# Patient Record
Sex: MALE | Race: WHITE | NOT HISPANIC OR LATINO | Employment: OTHER | URBAN - METROPOLITAN AREA
[De-identification: names, ages, dates, MRNs, and addresses within clinical notes are randomized per-mention and may not be internally consistent; named-entity substitution may affect disease eponyms.]

---

## 2022-06-18 ENCOUNTER — APPOINTMENT (OUTPATIENT)
Dept: CT IMAGING | Facility: CLINIC | Age: 62
End: 2022-06-18
Attending: EMERGENCY MEDICINE
Payer: COMMERCIAL

## 2022-06-18 ENCOUNTER — ANESTHESIA (OUTPATIENT)
Dept: SURGERY | Facility: CLINIC | Age: 62
End: 2022-06-18
Payer: COMMERCIAL

## 2022-06-18 ENCOUNTER — APPOINTMENT (OUTPATIENT)
Dept: GENERAL RADIOLOGY | Facility: CLINIC | Age: 62
End: 2022-06-18
Attending: UROLOGY
Payer: COMMERCIAL

## 2022-06-18 ENCOUNTER — ANESTHESIA EVENT (OUTPATIENT)
Dept: SURGERY | Facility: CLINIC | Age: 62
End: 2022-06-18
Payer: COMMERCIAL

## 2022-06-18 ENCOUNTER — HOSPITAL ENCOUNTER (OUTPATIENT)
Facility: CLINIC | Age: 62
Setting detail: OBSERVATION
Discharge: HOME | End: 2022-06-19
Attending: EMERGENCY MEDICINE | Admitting: INTERNAL MEDICINE
Payer: COMMERCIAL

## 2022-06-18 DIAGNOSIS — N20.1 URETERAL STONE: Primary | ICD-10-CM

## 2022-06-18 DIAGNOSIS — N20.1 URETERAL STONE: ICD-10-CM

## 2022-06-18 DIAGNOSIS — L40.50 PSORIATIC ARTHRITIS (H): Primary | ICD-10-CM

## 2022-06-18 PROBLEM — N20.0 RIGHT KIDNEY STONE: Status: ACTIVE | Noted: 2022-06-18

## 2022-06-18 LAB
ALBUMIN SERPL-MCNC: 3.8 G/DL (ref 3.4–5)
ALBUMIN UR-MCNC: NEGATIVE MG/DL
ALP SERPL-CCNC: 70 U/L (ref 40–150)
ALT SERPL W P-5'-P-CCNC: 24 U/L (ref 0–70)
ANION GAP SERPL CALCULATED.3IONS-SCNC: 5 MMOL/L (ref 3–14)
APPEARANCE UR: CLEAR
AST SERPL W P-5'-P-CCNC: 24 U/L (ref 0–45)
BASOPHILS # BLD AUTO: 0 10E3/UL (ref 0–0.2)
BASOPHILS NFR BLD AUTO: 0 %
BILIRUB SERPL-MCNC: 0.7 MG/DL (ref 0.2–1.3)
BILIRUB UR QL STRIP: NEGATIVE
BUN SERPL-MCNC: 25 MG/DL (ref 7–30)
CALCIUM SERPL-MCNC: 9.4 MG/DL (ref 8.5–10.1)
CHLORIDE BLD-SCNC: 105 MMOL/L (ref 94–109)
CO2 SERPL-SCNC: 26 MMOL/L (ref 20–32)
COLOR UR AUTO: ABNORMAL
CREAT SERPL-MCNC: 1.6 MG/DL (ref 0.66–1.25)
EOSINOPHIL # BLD AUTO: 0 10E3/UL (ref 0–0.7)
EOSINOPHIL NFR BLD AUTO: 0 %
ERYTHROCYTE [DISTWIDTH] IN BLOOD BY AUTOMATED COUNT: 13.5 % (ref 10–15)
GFR SERPL CREATININE-BSD FRML MDRD: 48 ML/MIN/1.73M2
GLUCOSE BLD-MCNC: 123 MG/DL (ref 70–99)
GLUCOSE UR STRIP-MCNC: NEGATIVE MG/DL
HCT VFR BLD AUTO: 49.9 % (ref 40–53)
HGB BLD-MCNC: 16.5 G/DL (ref 13.3–17.7)
HGB UR QL STRIP: ABNORMAL
IMM GRANULOCYTES # BLD: 0.1 10E3/UL
IMM GRANULOCYTES NFR BLD: 0 %
KETONES UR STRIP-MCNC: 20 MG/DL
LEUKOCYTE ESTERASE UR QL STRIP: NEGATIVE
LYMPHOCYTES # BLD AUTO: 0.9 10E3/UL (ref 0.8–5.3)
LYMPHOCYTES NFR BLD AUTO: 8 %
MCH RBC QN AUTO: 32.4 PG (ref 26.5–33)
MCHC RBC AUTO-ENTMCNC: 33.1 G/DL (ref 31.5–36.5)
MCV RBC AUTO: 98 FL (ref 78–100)
MONOCYTES # BLD AUTO: 0.6 10E3/UL (ref 0–1.3)
MONOCYTES NFR BLD AUTO: 5 %
NEUTROPHILS # BLD AUTO: 9.9 10E3/UL (ref 1.6–8.3)
NEUTROPHILS NFR BLD AUTO: 87 %
NITRATE UR QL: NEGATIVE
NRBC # BLD AUTO: 0 10E3/UL
NRBC BLD AUTO-RTO: 0 /100
PH UR STRIP: 6.5 [PH] (ref 5–7)
PLATELET # BLD AUTO: 161 10E3/UL (ref 150–450)
POTASSIUM BLD-SCNC: 4.4 MMOL/L (ref 3.4–5.3)
PROT SERPL-MCNC: 8.5 G/DL (ref 6.8–8.8)
RBC # BLD AUTO: 5.09 10E6/UL (ref 4.4–5.9)
RBC URINE: 133 /HPF
SARS-COV-2 RNA RESP QL NAA+PROBE: NEGATIVE
SODIUM SERPL-SCNC: 136 MMOL/L (ref 133–144)
SP GR UR STRIP: 1 (ref 1–1.03)
UROBILINOGEN UR STRIP-MCNC: NORMAL MG/DL
WBC # BLD AUTO: 11.5 10E3/UL (ref 4–11)
WBC # BLD AUTO: 9.3 10E3/UL (ref 4–11)
WBC URINE: 2 /HPF

## 2022-06-18 PROCEDURE — C2617 STENT, NON-COR, TEM W/O DEL: HCPCS | Performed by: UROLOGY

## 2022-06-18 PROCEDURE — 258N000003 HC RX IP 258 OP 636: Performed by: ANESTHESIOLOGY

## 2022-06-18 PROCEDURE — 250N000013 HC RX MED GY IP 250 OP 250 PS 637: Performed by: NURSE PRACTITIONER

## 2022-06-18 PROCEDURE — 250N000011 HC RX IP 250 OP 636: Performed by: EMERGENCY MEDICINE

## 2022-06-18 PROCEDURE — C1769 GUIDE WIRE: HCPCS | Performed by: UROLOGY

## 2022-06-18 PROCEDURE — C9803 HOPD COVID-19 SPEC COLLECT: HCPCS

## 2022-06-18 PROCEDURE — 258N000001 HC RX 258: Performed by: UROLOGY

## 2022-06-18 PROCEDURE — 272N000001 HC OR GENERAL SUPPLY STERILE: Performed by: UROLOGY

## 2022-06-18 PROCEDURE — 36415 COLL VENOUS BLD VENIPUNCTURE: CPT | Performed by: NURSE PRACTITIONER

## 2022-06-18 PROCEDURE — 74420 UROGRAPHY RTRGR +-KUB: CPT | Mod: 26 | Performed by: UROLOGY

## 2022-06-18 PROCEDURE — 710N000009 HC RECOVERY PHASE 1, LEVEL 1, PER MIN: Performed by: UROLOGY

## 2022-06-18 PROCEDURE — G0378 HOSPITAL OBSERVATION PER HR: HCPCS

## 2022-06-18 PROCEDURE — 258N000003 HC RX IP 258 OP 636: Performed by: NURSE PRACTITIONER

## 2022-06-18 PROCEDURE — 36415 COLL VENOUS BLD VENIPUNCTURE: CPT | Performed by: EMERGENCY MEDICINE

## 2022-06-18 PROCEDURE — 81001 URINALYSIS AUTO W/SCOPE: CPT | Performed by: EMERGENCY MEDICINE

## 2022-06-18 PROCEDURE — 999N000141 HC STATISTIC PRE-PROCEDURE NURSING ASSESSMENT: Performed by: UROLOGY

## 2022-06-18 PROCEDURE — 250N000009 HC RX 250: Performed by: UROLOGY

## 2022-06-18 PROCEDURE — 360N000075 HC SURGERY LEVEL 2, PER MIN: Performed by: UROLOGY

## 2022-06-18 PROCEDURE — 96365 THER/PROPH/DIAG IV INF INIT: CPT | Mod: 59

## 2022-06-18 PROCEDURE — 99285 EMERGENCY DEPT VISIT HI MDM: CPT | Mod: 25

## 2022-06-18 PROCEDURE — 255N000002 HC RX 255 OP 636: Performed by: UROLOGY

## 2022-06-18 PROCEDURE — U0005 INFEC AGEN DETEC AMPLI PROBE: HCPCS | Performed by: EMERGENCY MEDICINE

## 2022-06-18 PROCEDURE — 74177 CT ABD & PELVIS W/CONTRAST: CPT

## 2022-06-18 PROCEDURE — 96361 HYDRATE IV INFUSION ADD-ON: CPT | Mod: XU

## 2022-06-18 PROCEDURE — 85025 COMPLETE CBC W/AUTO DIFF WBC: CPT | Performed by: EMERGENCY MEDICINE

## 2022-06-18 PROCEDURE — 370N000017 HC ANESTHESIA TECHNICAL FEE, PER MIN: Performed by: UROLOGY

## 2022-06-18 PROCEDURE — 258N000003 HC RX IP 258 OP 636: Performed by: EMERGENCY MEDICINE

## 2022-06-18 PROCEDURE — 85048 AUTOMATED LEUKOCYTE COUNT: CPT | Performed by: NURSE PRACTITIONER

## 2022-06-18 PROCEDURE — 99220 PR INITIAL OBSERVATION CARE,LEVEL III: CPT | Performed by: NURSE PRACTITIONER

## 2022-06-18 PROCEDURE — 250N000009 HC RX 250: Performed by: NURSE ANESTHETIST, CERTIFIED REGISTERED

## 2022-06-18 PROCEDURE — 87102 FUNGUS ISOLATION CULTURE: CPT | Performed by: UROLOGY

## 2022-06-18 PROCEDURE — 99203 OFFICE O/P NEW LOW 30 MIN: CPT | Mod: 25 | Performed by: UROLOGY

## 2022-06-18 PROCEDURE — 999N000179 XR SURGERY CARM FLUORO LESS THAN 5 MIN W STILLS

## 2022-06-18 PROCEDURE — 250N000011 HC RX IP 250 OP 636: Performed by: NURSE ANESTHETIST, CERTIFIED REGISTERED

## 2022-06-18 PROCEDURE — 96375 TX/PRO/DX INJ NEW DRUG ADDON: CPT

## 2022-06-18 PROCEDURE — 87086 URINE CULTURE/COLONY COUNT: CPT | Performed by: UROLOGY

## 2022-06-18 PROCEDURE — 80053 COMPREHEN METABOLIC PANEL: CPT | Performed by: EMERGENCY MEDICINE

## 2022-06-18 PROCEDURE — 52332 CYSTOSCOPY AND TREATMENT: CPT | Mod: RT | Performed by: UROLOGY

## 2022-06-18 DEVICE — STENT URETERAL POLARIS ULTRA 6FRX26CM M0061921330
Type: IMPLANTABLE DEVICE | Site: URETER | Status: NON-FUNCTIONAL
Removed: 2022-07-01

## 2022-06-18 RX ORDER — EPHEDRINE SULFATE 50 MG/ML
INJECTION, SOLUTION INTRAVENOUS PRN
Status: DISCONTINUED | OUTPATIENT
Start: 2022-06-18 | End: 2022-06-18

## 2022-06-18 RX ORDER — CYCLOBENZAPRINE HCL 5 MG
10 TABLET ORAL 3 TIMES DAILY PRN
Status: DISCONTINUED | OUTPATIENT
Start: 2022-06-18 | End: 2022-06-19 | Stop reason: HOSPADM

## 2022-06-18 RX ORDER — FENTANYL CITRATE 50 UG/ML
INJECTION, SOLUTION INTRAMUSCULAR; INTRAVENOUS PRN
Status: DISCONTINUED | OUTPATIENT
Start: 2022-06-18 | End: 2022-06-18

## 2022-06-18 RX ORDER — NALOXONE HYDROCHLORIDE 0.4 MG/ML
0.2 INJECTION, SOLUTION INTRAMUSCULAR; INTRAVENOUS; SUBCUTANEOUS
Status: DISCONTINUED | OUTPATIENT
Start: 2022-06-18 | End: 2022-06-19 | Stop reason: HOSPADM

## 2022-06-18 RX ORDER — NALOXONE HYDROCHLORIDE 0.4 MG/ML
0.4 INJECTION, SOLUTION INTRAMUSCULAR; INTRAVENOUS; SUBCUTANEOUS
Status: DISCONTINUED | OUTPATIENT
Start: 2022-06-18 | End: 2022-06-19 | Stop reason: HOSPADM

## 2022-06-18 RX ORDER — ONDANSETRON 2 MG/ML
4 INJECTION INTRAMUSCULAR; INTRAVENOUS EVERY 6 HOURS PRN
Status: DISCONTINUED | OUTPATIENT
Start: 2022-06-18 | End: 2022-06-19 | Stop reason: HOSPADM

## 2022-06-18 RX ORDER — CEFTRIAXONE 1 G/1
1 INJECTION, POWDER, FOR SOLUTION INTRAMUSCULAR; INTRAVENOUS ONCE
Status: COMPLETED | OUTPATIENT
Start: 2022-06-18 | End: 2022-06-18

## 2022-06-18 RX ORDER — HYDROMORPHONE HCL IN WATER/PF 6 MG/30 ML
0.4 PATIENT CONTROLLED ANALGESIA SYRINGE INTRAVENOUS EVERY 5 MIN PRN
Status: DISCONTINUED | OUTPATIENT
Start: 2022-06-18 | End: 2022-06-18 | Stop reason: HOSPADM

## 2022-06-18 RX ORDER — SODIUM CHLORIDE 9 MG/ML
INJECTION, SOLUTION INTRAVENOUS CONTINUOUS
Status: DISCONTINUED | OUTPATIENT
Start: 2022-06-18 | End: 2022-06-19 | Stop reason: HOSPADM

## 2022-06-18 RX ORDER — FENTANYL CITRATE 50 UG/ML
25 INJECTION, SOLUTION INTRAMUSCULAR; INTRAVENOUS
Status: DISCONTINUED | OUTPATIENT
Start: 2022-06-18 | End: 2022-06-18 | Stop reason: HOSPADM

## 2022-06-18 RX ORDER — PREDNISONE 10 MG/1
10 TABLET ORAL SEE ADMIN INSTRUCTIONS
COMMUNITY
Start: 2021-11-11

## 2022-06-18 RX ORDER — LIDOCAINE HYDROCHLORIDE 20 MG/ML
JELLY TOPICAL PRN
Status: DISCONTINUED | OUTPATIENT
Start: 2022-06-18 | End: 2022-06-18 | Stop reason: HOSPADM

## 2022-06-18 RX ORDER — KETOROLAC TROMETHAMINE 15 MG/ML
15 INJECTION, SOLUTION INTRAMUSCULAR; INTRAVENOUS ONCE
Status: COMPLETED | OUTPATIENT
Start: 2022-06-18 | End: 2022-06-18

## 2022-06-18 RX ORDER — CEFAZOLIN SODIUM 2 G/50ML
2 SOLUTION INTRAVENOUS
Status: CANCELLED | OUTPATIENT
Start: 2022-06-18

## 2022-06-18 RX ORDER — DEXAMETHASONE SODIUM PHOSPHATE 4 MG/ML
INJECTION, SOLUTION INTRA-ARTICULAR; INTRALESIONAL; INTRAMUSCULAR; INTRAVENOUS; SOFT TISSUE PRN
Status: DISCONTINUED | OUTPATIENT
Start: 2022-06-18 | End: 2022-06-18

## 2022-06-18 RX ORDER — ONDANSETRON 4 MG/1
4 TABLET, ORALLY DISINTEGRATING ORAL EVERY 30 MIN PRN
Status: DISCONTINUED | OUTPATIENT
Start: 2022-06-18 | End: 2022-06-18 | Stop reason: HOSPADM

## 2022-06-18 RX ORDER — ASPIRIN 325 MG
325 TABLET ORAL EVERY 6 HOURS PRN
Status: ON HOLD | COMMUNITY
End: 2022-06-19

## 2022-06-18 RX ORDER — APREMILAST 30 MG/1
1 TABLET, FILM COATED ORAL 2 TIMES DAILY
COMMUNITY
Start: 2022-04-06

## 2022-06-18 RX ORDER — ACETAMINOPHEN 325 MG/1
650 TABLET ORAL EVERY 6 HOURS PRN
Status: DISCONTINUED | OUTPATIENT
Start: 2022-06-18 | End: 2022-06-19 | Stop reason: HOSPADM

## 2022-06-18 RX ORDER — LABETALOL HYDROCHLORIDE 5 MG/ML
10 INJECTION, SOLUTION INTRAVENOUS EVERY 10 MIN PRN
Status: DISCONTINUED | OUTPATIENT
Start: 2022-06-18 | End: 2022-06-18 | Stop reason: HOSPADM

## 2022-06-18 RX ORDER — KETOROLAC TROMETHAMINE 15 MG/ML
15 INJECTION, SOLUTION INTRAMUSCULAR; INTRAVENOUS EVERY 6 HOURS PRN
Status: DISCONTINUED | OUTPATIENT
Start: 2022-06-18 | End: 2022-06-19 | Stop reason: HOSPADM

## 2022-06-18 RX ORDER — CYCLOBENZAPRINE HCL 10 MG
10 TABLET ORAL 3 TIMES DAILY PRN
COMMUNITY

## 2022-06-18 RX ORDER — CEFAZOLIN SODIUM 2 G/50ML
2 SOLUTION INTRAVENOUS SEE ADMIN INSTRUCTIONS
Status: CANCELLED | OUTPATIENT
Start: 2022-06-18

## 2022-06-18 RX ORDER — TAMSULOSIN HYDROCHLORIDE 0.4 MG/1
0.4 CAPSULE ORAL DAILY
Status: DISCONTINUED | OUTPATIENT
Start: 2022-06-18 | End: 2022-06-19 | Stop reason: HOSPADM

## 2022-06-18 RX ORDER — MEPERIDINE HYDROCHLORIDE 25 MG/ML
25 INJECTION INTRAMUSCULAR; INTRAVENOUS; SUBCUTANEOUS
Status: DISCONTINUED | OUTPATIENT
Start: 2022-06-18 | End: 2022-06-18 | Stop reason: HOSPADM

## 2022-06-18 RX ORDER — SODIUM CHLORIDE, SODIUM LACTATE, POTASSIUM CHLORIDE, CALCIUM CHLORIDE 600; 310; 30; 20 MG/100ML; MG/100ML; MG/100ML; MG/100ML
INJECTION, SOLUTION INTRAVENOUS CONTINUOUS
Status: DISCONTINUED | OUTPATIENT
Start: 2022-06-18 | End: 2022-06-18 | Stop reason: HOSPADM

## 2022-06-18 RX ORDER — FENTANYL CITRATE 50 UG/ML
25 INJECTION, SOLUTION INTRAMUSCULAR; INTRAVENOUS EVERY 5 MIN PRN
Status: DISCONTINUED | OUTPATIENT
Start: 2022-06-18 | End: 2022-06-18 | Stop reason: HOSPADM

## 2022-06-18 RX ORDER — ONDANSETRON 4 MG/1
4 TABLET, ORALLY DISINTEGRATING ORAL EVERY 6 HOURS PRN
Status: DISCONTINUED | OUTPATIENT
Start: 2022-06-18 | End: 2022-06-19 | Stop reason: HOSPADM

## 2022-06-18 RX ORDER — PROPOFOL 10 MG/ML
INJECTION, EMULSION INTRAVENOUS PRN
Status: DISCONTINUED | OUTPATIENT
Start: 2022-06-18 | End: 2022-06-18

## 2022-06-18 RX ORDER — ONDANSETRON 2 MG/ML
INJECTION INTRAMUSCULAR; INTRAVENOUS PRN
Status: DISCONTINUED | OUTPATIENT
Start: 2022-06-18 | End: 2022-06-18

## 2022-06-18 RX ORDER — HYDROMORPHONE HCL IN WATER/PF 6 MG/30 ML
0.2 PATIENT CONTROLLED ANALGESIA SYRINGE INTRAVENOUS ONCE
Status: COMPLETED | OUTPATIENT
Start: 2022-06-18 | End: 2022-06-18

## 2022-06-18 RX ORDER — LIDOCAINE 40 MG/G
CREAM TOPICAL
Status: DISCONTINUED | OUTPATIENT
Start: 2022-06-18 | End: 2022-06-18 | Stop reason: HOSPADM

## 2022-06-18 RX ORDER — KETOROLAC TROMETHAMINE 30 MG/ML
15 INJECTION, SOLUTION INTRAMUSCULAR; INTRAVENOUS
Status: DISCONTINUED | OUTPATIENT
Start: 2022-06-18 | End: 2022-06-18 | Stop reason: HOSPADM

## 2022-06-18 RX ORDER — OXYCODONE HYDROCHLORIDE 5 MG/1
5 TABLET ORAL EVERY 4 HOURS PRN
Status: DISCONTINUED | OUTPATIENT
Start: 2022-06-18 | End: 2022-06-18 | Stop reason: HOSPADM

## 2022-06-18 RX ORDER — FOLIC ACID 1 MG/1
1 TABLET ORAL DAILY
COMMUNITY
Start: 2020-09-28

## 2022-06-18 RX ORDER — PHENYLEPHRINE HYDROCHLORIDE 10 MG/ML
INJECTION INTRAVENOUS PRN
Status: DISCONTINUED | OUTPATIENT
Start: 2022-06-18 | End: 2022-06-18

## 2022-06-18 RX ORDER — ONDANSETRON 2 MG/ML
4 INJECTION INTRAMUSCULAR; INTRAVENOUS EVERY 30 MIN PRN
Status: DISCONTINUED | OUTPATIENT
Start: 2022-06-18 | End: 2022-06-18 | Stop reason: HOSPADM

## 2022-06-18 RX ORDER — LIDOCAINE HYDROCHLORIDE 10 MG/ML
INJECTION, SOLUTION INFILTRATION; PERINEURAL PRN
Status: DISCONTINUED | OUTPATIENT
Start: 2022-06-18 | End: 2022-06-18

## 2022-06-18 RX ORDER — GLYCOPYRROLATE 0.2 MG/ML
INJECTION, SOLUTION INTRAMUSCULAR; INTRAVENOUS PRN
Status: DISCONTINUED | OUTPATIENT
Start: 2022-06-18 | End: 2022-06-18

## 2022-06-18 RX ORDER — IOPAMIDOL 755 MG/ML
90 INJECTION, SOLUTION INTRAVASCULAR ONCE
Status: COMPLETED | OUTPATIENT
Start: 2022-06-18 | End: 2022-06-18

## 2022-06-18 RX ADMIN — ONDANSETRON HYDROCHLORIDE 4 MG: 2 INJECTION, SOLUTION INTRAVENOUS at 15:19

## 2022-06-18 RX ADMIN — KETOROLAC TROMETHAMINE 15 MG: 15 INJECTION, SOLUTION INTRAMUSCULAR; INTRAVENOUS at 09:42

## 2022-06-18 RX ADMIN — GLYCOPYRROLATE 0.2 MG: 0.2 INJECTION, SOLUTION INTRAMUSCULAR; INTRAVENOUS at 15:21

## 2022-06-18 RX ADMIN — DEXAMETHASONE SODIUM PHOSPHATE 8 MG: 4 INJECTION, SOLUTION INTRA-ARTICULAR; INTRALESIONAL; INTRAMUSCULAR; INTRAVENOUS; SOFT TISSUE at 15:09

## 2022-06-18 RX ADMIN — PROPOFOL 160 MG: 10 INJECTION, EMULSION INTRAVENOUS at 15:08

## 2022-06-18 RX ADMIN — CEFTRIAXONE 1 G: 1 INJECTION, POWDER, FOR SOLUTION INTRAMUSCULAR; INTRAVENOUS at 11:55

## 2022-06-18 RX ADMIN — SODIUM CHLORIDE: 9 INJECTION, SOLUTION INTRAVENOUS at 18:58

## 2022-06-18 RX ADMIN — LIDOCAINE HYDROCHLORIDE 50 MG: 10 INJECTION, SOLUTION INFILTRATION; PERINEURAL at 15:08

## 2022-06-18 RX ADMIN — FENTANYL CITRATE 50 MCG: 50 INJECTION, SOLUTION INTRAMUSCULAR; INTRAVENOUS at 15:15

## 2022-06-18 RX ADMIN — SODIUM CHLORIDE, POTASSIUM CHLORIDE, SODIUM LACTATE AND CALCIUM CHLORIDE: 600; 310; 30; 20 INJECTION, SOLUTION INTRAVENOUS at 13:38

## 2022-06-18 RX ADMIN — PHENYLEPHRINE HYDROCHLORIDE 100 MCG: 10 INJECTION INTRAVENOUS at 15:26

## 2022-06-18 RX ADMIN — PHENYLEPHRINE HYDROCHLORIDE 100 MCG: 10 INJECTION INTRAVENOUS at 15:33

## 2022-06-18 RX ADMIN — IOPAMIDOL 90 ML: 755 INJECTION, SOLUTION INTRAVENOUS at 09:21

## 2022-06-18 RX ADMIN — HYDROMORPHONE HYDROCHLORIDE 0.2 MG: 0.2 INJECTION, SOLUTION INTRAMUSCULAR; INTRAVENOUS; SUBCUTANEOUS at 12:28

## 2022-06-18 RX ADMIN — EPHEDRINE SULFATE 5 MG: 50 INJECTION, SOLUTION INTRAVENOUS at 15:23

## 2022-06-18 RX ADMIN — TAMSULOSIN HYDROCHLORIDE 0.4 MG: 0.4 CAPSULE ORAL at 18:10

## 2022-06-18 RX ADMIN — FENTANYL CITRATE 50 MCG: 50 INJECTION, SOLUTION INTRAMUSCULAR; INTRAVENOUS at 15:08

## 2022-06-18 RX ADMIN — MIDAZOLAM 2 MG: 1 INJECTION INTRAMUSCULAR; INTRAVENOUS at 15:05

## 2022-06-18 RX ADMIN — SODIUM CHLORIDE 1000 ML: 9 INJECTION, SOLUTION INTRAVENOUS at 10:23

## 2022-06-18 RX ADMIN — SODIUM CHLORIDE, POTASSIUM CHLORIDE, SODIUM LACTATE AND CALCIUM CHLORIDE: 600; 310; 30; 20 INJECTION, SOLUTION INTRAVENOUS at 15:05

## 2022-06-18 ASSESSMENT — ENCOUNTER SYMPTOMS
DIARRHEA: 1
VOMITING: 0
BLOOD IN STOOL: 0
FEVER: 0
BACK PAIN: 1
ABDOMINAL PAIN: 1

## 2022-06-18 NOTE — PHARMACY-ADMISSION MEDICATION HISTORY
Admission medication history interview status for this patient is complete. See [facility] admission navigator for allergy information, prior to admission medications and immunization status.     Medication history interview done, indicate source(s): Patient  Medication history resources (including written lists, pill bottles, clinic record): [facility] and [facility]  Pharmacy: [id number] [place], [place] - [address] [address]      Changes made to PTA medication list:  Added: Otezla, Folic Acid, Cyclobenzaprine, Prednisone  Changed: Methotrexate 8 tab --> 10 tab  Reported as Not Taking: None  Removed: Levothyroxine    Actions taken by pharmacist (provider contacted, etc): Sticky note to provider     Additional medication history information: Patient has prednisone left over from taper and uses as PRN for psoriasis flairs.     Medication reconciliation/reorder completed by provider prior to medication history?  N   (Y/N)       Prior to Admission medications    Medication Sig Last Dose Taking? Auth Provider Long Term End Date   apremilast (OTEZLA) 30 MG tablet Take 1 tablet by mouth 2 times daily 6/17/2022 at Unknown time Yes Unknown, Entered By History     cyclobenzaprine (FLEXERIL) 10 MG tablet Take 10 mg by mouth 3 times daily as needed for muscle spasms 6/18/2022 at Unknown time Yes Unknown, Entered By History     folic acid (FOLVITE) 1 MG tablet Take 1 tablet by mouth daily 6/17/2022 at Unknown time Yes Unknown, Entered By History     Methotrexate Sodium 2.5 MG TABS Take 10 tablets by mouth once a week On Mondays Past Week at Unknown time Yes Reported, Patient     predniSONE (DELTASONE) 10 MG tablet Take 10 mg by mouth daily Take 15 mg for 3-5 days, then taper by 5 mg every 3-5 days until off.  Yes Unknown, Entered By History     ORDER FOR DME Equipment being ordered: Lift chair   Cabrera Magaña DO

## 2022-06-18 NOTE — CONSULTS
Urology Consultation      Ramesh Galvin MRN# [id number]   YOB: 1960 Age: 61 year old   Date of Admission: 6/18/2022     Reason for consult: Obstructing ureteral stone             Assessment and Plan:   Ramesh Galvin is a 61 year old male with prior history of nephrolithiasis now with 2 obstructing right distal ureteral stones with significant perinephric stranding concerning for upstream UTI. Urine analysis unrevealing for infection, slightly elevated WBC, afebrile     I explained to him to his long nephric stranding seems out of proportion for simply an obstructing stone and I am concerned about an underlying UTI. Therefore, my recommendation would be to stage his stone surgery and place a ureteral stent today and defer the surgery to 1-2 weeks from now. We will send intraoperative urine culture.     We discussed cystoscopy and ureteral stent placement in details. Risks of the procedure including but not limited to infection, bleeding damage to surrounding structure and possibility of nephrostomy tube if unable to place the stent rerogradely. We also discussed stent related pain, discomfort and migration.     To OR for cystoscopy and right ureteral stent placement               Chief Complaint:   Right flank pain          History of Present Illness:   This patient is a 61 year old male who presents with a few days history of right flank pain. This is his second time presenting to ED for flank pain. Repeat CT scan showed 2 obstructing right ureteral stones with moderate right hydronephrosis and significant per nephric stranding. His creatinine is elevated at 1.6 and urine analysis does not show any clear evidence of infection. WBC is slightly elevated at 11.5.     He has passed stones before but has not had any procedure done. He denies any dysuria or hematuria.                Past Medical History:     Past Medical History:   Diagnosis Date     AVM (arteriovenous malformation)       Hypercholesteremia      Hypothyroidism     blood work indicated this, no tumor     Psoriasis      Psoriatic arthritis (H)            Past Surgical History:   History reviewed. No pertinent surgical history.             Social History:     Social History     Tobacco Use     Smoking status: Former Smoker     Packs/day: 1.50     Years: 0.00     Pack years: 0.00     Types: Cigarettes     Quit date: 1981     Years since quittin.4     Smokeless tobacco: Never Used   Substance Use Topics     Alcohol use: Yes     Comment: 1-2 x/month     Drug use: No               Family History:   Non relevant             Allergies:   All allergies reviewed and addressed          Medications:     Current Facility-Administered Medications   Medication     fentaNYL (PF) (SUBLIMAZE) injection 25 mcg     HYDROmorphone (DILAUDID) injection 0.4 mg     ketorolac (TORADOL) injection 15 mg     labetalol (NORMODYNE/TRANDATE) injection 10 mg     lactated ringers infusion     lactated ringers infusion     lidocaine (LMX4) cream     lidocaine 1 % 0.1-1 mL     meperidine (DEMEROL) injection 25 mg     naloxone (NARCAN) injection 0.2 mg    Or     naloxone (NARCAN) injection 0.4 mg    Or     naloxone (NARCAN) injection 0.2 mg    Or     naloxone (NARCAN) injection 0.4 mg     ondansetron (ZOFRAN ODT) ODT tab 4 mg    Or     ondansetron (ZOFRAN) injection 4 mg     oxyCODONE (ROXICODONE) tablet 5 mg     Patient RECEIVING antibiotic to treat a different condition and it provides ADEQUATE COVERAGE for this surgical procedure.     prochlorperazine (COMPAZINE) injection 5 mg     sodium chloride (PF) 0.9% PF flush 3 mL     sodium chloride (PF) 0.9% PF flush 3 mL               Review of Systems:   The 10 point Review of Systems is negative other than noted in the HPI            Physical Exam:   Vitals were reviewed  Temp: 97.6  F (36.4  C) Temp src: Temporal BP: (!) 169/94 Pulse: 63   Resp: 20 SpO2: 96 % O2 Device: None (Room air)    Constitutional:    awake, alert, cooperative, no apparent distress, and appears stated age     Lungs:   No increased work of breathing, good air exchange, clear to auscultation bilaterally, no crackles or wheezing     Cardiovascular:   Normal apical impulse, regular rate and rhythm, normal S1 and S2, no S3 or S4, and no murmur noted     Genitounirinary:   Deferred           Data:     Lab Results   Component Value Date    WBC 11.5 (H) 06/18/2022    HGB 16.5 06/18/2022    HCT 49.9 06/18/2022    MCV 98 06/18/2022     06/18/2022     Lab Results   Component Value Date    CR 1.60 (H) 06/18/2022     No results found for: INR  All cardiac studies reviewed by me.  CT scan of the abdomen:   Obstructing right ureteral stone (3 and 1 mm) with moderate hydronephrosis and significant perinephric stranding

## 2022-06-18 NOTE — ED NOTES
[facility]  ED Nurse Handoff Report    Ramesh Galvin is a 61 year old male   ED Chief complaint: Abdominal Pain and Diarrhea  . ED Diagnosis:   Final diagnoses:   Ureteral stone     Allergies:   Allergies   Allergen Reactions     Codeine Sulfate        Code Status: Full Code  Activity level - Baseline/Home:  Independent. Activity Level - Current:   Independent. Lift room needed: No. Bariatric: No   Needed: No   Isolation: No. Infection: Not Applicable.     Vital Signs:   Vitals:    06/18/22 0847 06/18/22 1030 06/18/22 1100   BP: (!) 162/104 (!) 152/92 (!) 157/99   Pulse: 64 64 64   Resp: 20     Temp: 97.3  F (36.3  C)     TempSrc: Temporal     SpO2: 98% 93% 95%       Cardiac Rhythm:  ,      Pain level:    Patient confused: No. Patient Falls Risk: No.   Elimination Status: Has voided   Patient Report - Initial Complaint: abdominal and back pain. Focused Assessment: Ramesh Galvin is a 61 year old male with history of kidney stones who presents with bilateral lower quadrant abdominal pain and diarrhea. Symptoms began acutely last night and progressed this morning with diarrhea. No fever, vomiting, or blood in stool. He rates his pain 8/10 in severity. Pain is more severe in the right lower quadrant than the left quadrant and radiates to his lower back. He took aspirin and flexeril with some improvement to his back pain. He mentions previous hematuria and kidney stones, but he states this pain feels different. He reports previous hernia and colonoscopy.    Tests Performed: see list. Abnormal Results:   CT Abdomen Pelvis w Contrast   Final Result   IMPRESSION:    1.  Two obstructing calculi adjacent to each other in the right distal ureter measuring 3 mm and 6 mm with moderate right hydroureteronephrosis and perinephric stranding. Adjacent small nonobstructing right renal calculus.   2.  Cholelithiasis.        Labs Ordered and Resulted from Time of ED Arrival to Time of ED Departure    COMPREHENSIVE METABOLIC PANEL - Abnormal       Result Value    Sodium 136      Potassium 4.4      Chloride 105      Carbon Dioxide (CO2) 26      Anion Gap 5      Urea Nitrogen 25      Creatinine 1.60 (*)     Calcium 9.4      Glucose 123 (*)     Alkaline Phosphatase 70      AST 24      ALT 24      Protein Total 8.5      Albumin 3.8      Bilirubin Total 0.7      GFR Estimate 48 (*)    CBC WITH PLATELETS AND DIFFERENTIAL - Abnormal    WBC Count 11.5 (*)     RBC Count 5.09      Hemoglobin 16.5      Hematocrit 49.9      MCV 98      MCH 32.4      MCHC 33.1      RDW 13.5      Platelet Count 161      % Neutrophils 87      % Lymphocytes 8      % Monocytes 5      % Eosinophils 0      % Basophils 0      % Immature Granulocytes 0      NRBCs per 100 WBC 0      Absolute Neutrophils 9.9 (*)     Absolute Lymphocytes 0.9      Absolute Monocytes 0.6      Absolute Eosinophils 0.0      Absolute Basophils 0.0      Absolute Immature Granulocytes 0.1      Absolute NRBCs 0.0     ROUTINE UA WITH MICROSCOPIC REFLEX TO CULTURE - Abnormal    Color Urine Light Yellow      Appearance Urine Clear      Glucose Urine Negative      Bilirubin Urine Negative      Ketones Urine 20  (*)     Specific Gravity Urine 1.005      Blood Urine Large (*)     pH Urine 6.5      Protein Albumin Urine Negative      Urobilinogen Urine Normal      Nitrite Urine Negative      Leukocyte Esterase Urine Negative      RBC Urine 133 (*)     WBC Urine 2     COVID-19 VIRUS (CORONAVIRUS) BY PCR   .   Treatments provided: fluids, IV antibiotics  Family Comments: NA  OBS brochure/video discussed/provided to patient:  Yes  ED Medications:   Medications   cefTRIAXone (ROCEPHIN) 1 g vial to attach to  mL bag for ADULTS or NS 50 mL bag for PEDS (1 g Intravenous New Bag 6/18/22 3625)   ketorolac (TORADOL) injection 15 mg (15 mg Intravenous Given 6/18/22 0946)   iopamidol (ISOVUE-370) solution 90 mL (90 mLs Intravenous Given 6/18/22 0916)   sodium chloride (PF) 0.9% PF flush  65 mL (65 mLs Intravenous Given 6/18/22 0922)   0.9% sodium chloride BOLUS (0 mLs Intravenous Stopped 6/18/22 1123)     Drips infusing:  Yes  For the majority of the shift, the patient's behavior Green. Interventions performed were na.    Sepsis treatment initiated: No     Patient tested for COVID 19 prior to admission: YES    ED Nurse Name/Phone Number: Diana Brown RN,   12:06 PM

## 2022-06-18 NOTE — ED TRIAGE NOTES
Pt arrives with c/o lower abdominal pain and back pain that started yesterday. Pt reports diarrhea the last few times he has used the bathroom, no blood in it. Pt denies nausea/vomiting. ABCs intact.      Triage Assessment     Row Name 06/18/22 0811       Triage Assessment (Adult)    Airway WDL WDL       Respiratory WDL    Respiratory WDL WDL       Skin Circulation/Temperature WDL    Skin Circulation/Temperature WDL WDL       Cardiac WDL    Cardiac WDL WDL       Peripheral/Neurovascular WDL    Peripheral Neurovascular WDL WDL       Cognitive/Neuro/Behavioral WDL    Cognitive/Neuro/Behavioral WDL WDL

## 2022-06-18 NOTE — OP NOTE
DATE OF SERVICE: 06/18/22   PREOPERATIVE DIAGNOSIS: Right ureteral Stone  POSTOPERATIVE DIAGNOSIS: Same    PROCEDURES PERFORMED:   1. Cystourethroscopy  2. Right retrograde pyelography with interpretation of intraoperative fluoroscopic imaging  3. Right ureteral stent placement    STAFF SURGEON: Michael Ho MD  ANESTHESIA: General    ESTIMATED BLOOD LOSS: 1 cc  DRAINS/TUBES:  6 Amharic x 26 cm double-J ureteral stent, 16 Fr Hanson catheter     COMPLICATIONS: None.   SPECIMEN: Renal pelvis fluid for culture  SIGNIFICANT FINDINGS: Mild distal bulbar urethral stricture relatively easily accommodated 22 fr cystoscope, urine upstream from blockage appeared clear     BRIEF OPERATIVE INDICATIONS: Ramesh Galvin is a 61 year old male who recently presented with left ureteral stone. After a discussion of all risks, benefits, and alternatives, the patient elected to proceed with ureteral stent placement. The patient understands the need for more than one procedure to eliminate stone burden.      DESCRIPTION OF PROCEDURE:  After informed consent was verified, the patient was transported to the operating room, placed supine on the table. After adequate anesthesia was induced, he was placed in dorsal lithotomy and prepped and draped in the usual sterile fashion. A timeout was taken to confirm correct patient, procedure and laterality. Pre-operative IV antibiotics were administered.    A 22 Amharic rigid cystoscope was inserted per a well-lubricated urethra. The anterior urethra was unremarkable. There was a mild, short proximal bulbar urethral tricture which was gently dilated by advancing the scope and scope was advanced to the bladder. The ureteral orifices were orthotopic bilaterally. The left ureteral orifice was identified and cannulated with a Sensor wire and 6-Fr open-ended catheter. The wire passed without resistance into the upper pole. A sample of renal pelvis fluid was collected for culture. A retrograde pyelogram  showed Moderate hydronephrosis, but no other filling defects.  A 6 Fr x 26 cm double-J stent was advanced over the Sensor wire, and a good proximal curl was seen in the renal pelvis and the distal curl was seen in the bladder. The bladder was drained. 16 Fr Hanson catheter was placed and balloon was inflated using 10 ml water.   The patient tolerated the procedure well.  No apparent complications. he was transported to the postanesthesia care unit in stable condition.      PLAN  Admit to medicine overnight, patient drove himself to the hospital and does not have anyone to pick him up so needs to stay until tomorrow   Maintain Hanson, can be removed tomorrow morning   Continue antibiotic and switch to PO tomorrow morning prior to discharge (Bactrim or Ciprofloxacin)   Ok to discharge tomorrow unless there is a change in clinical picture   Will arrange for lithotripsy as an outpatient       Michael Ho MD   Department of Urology   [facility]

## 2022-06-18 NOTE — ED PROVIDER NOTES
History   Chief Complaint:  Abdominal Pain and Diarrhea       The history is provided by the patient.      Ramesh Galvin is a 61 year old male with history of kidney stones who presents with bilateral lower quadrant abdominal pain and diarrhea. Symptoms began acutely last night and progressed this morning with diarrhea. No fever, vomiting, or blood in stool. He rates his pain 8/10 in severity. Pain is more severe in the right lower quadrant than the left quadrant and radiates to his lower back. He took aspirin and flexeril with some improvement to his back pain. He mentions previous hematuria and kidney stones, but he states this pain feels different. He reports previous hernia and colonoscopy.     Review of Systems   Constitutional: Negative for fever.   Gastrointestinal: Positive for abdominal pain and diarrhea. Negative for blood in stool and vomiting.   Musculoskeletal: Positive for back pain.   All other systems reviewed and are negative.    Allergies:  Codeine Sulfate  Morphine And Related    Medications:  Levothyroxine sodium   Methotrexate Sodium  Otezla     Past Medical History:     AVM (arteriovenous malformation)   Hypercholesteremia   Hypothyroidism   Psoriasis   Psoriatic arthritis   Congenital anomaly of cerebrovascular system  Multiple sclerosis   Obesity (BMI 30-39.9)  Kidney stones    Past Surgical History:    Incision and drainage    Family History:    Father: lymphatic cancer, hypertension, Diabetes Mellitus Type 2     Social History:  Presents alone.   PCP: No primary care provider on file.      Physical Exam     Patient Vitals for the past 24 hrs:   BP Temp Temp src Pulse Resp SpO2   06/18/22 1100 (!) 157/99 -- -- 64 -- 95 %   06/18/22 1030 (!) 152/92 -- -- 64 -- 93 %   06/18/22 0847 (!) 162/104 97.3  F (36.3  C) Temporal 64 20 98 %     Physical Exam  General:   Well-nourished   Speaking in full sentences  Eyes:   Conjunctiva without injection or scleral icterus  ENT:   Moist mucous  membranes   Nares patent   Pinnae normal  Neck:   Full ROM   No stiffness appreciated  Resp:   Lungs CTAB   No crackles, wheezing or audible rubs   Good air movement  CV:    Normal rate, regular rhythm   S1 and S2 present   No murmur, gallop or rub  GI:   BS present   Abdomen soft without distention   Tenderness to palpation to lower abdomen bilaterally, R>L   No guarding or rebound tenderness  Skin:   Warm, dry, well perfused   No rashes or open wounds on exposed skin  MSK:   Moves all extremities   No focal deformities or swelling  Neuro:   Alert   Answers questions appropriately   Moves all extremities equally   Gait stable  Psych:   Normal affect, normal mood    Emergency Department Course     Imaging:  CT Abdomen Pelvis w Contrast   Final Result   IMPRESSION:    1.  Two obstructing calculi adjacent to each other in the right distal ureter measuring 3 mm and 6 mm with moderate right hydroureteronephrosis and perinephric stranding. Adjacent small nonobstructing right renal calculus.   2.  Cholelithiasis.        Report per radiology    Laboratory:  Labs Ordered and Resulted from Time of ED Arrival to Time of ED Departure   COMPREHENSIVE METABOLIC PANEL - Abnormal       Result Value    Sodium 136      Potassium 4.4      Chloride 105      Carbon Dioxide (CO2) 26      Anion Gap 5      Urea Nitrogen 25      Creatinine 1.60 (*)     Calcium 9.4      Glucose 123 (*)     Alkaline Phosphatase 70      AST 24      ALT 24      Protein Total 8.5      Albumin 3.8      Bilirubin Total 0.7      GFR Estimate 48 (*)    CBC WITH PLATELETS AND DIFFERENTIAL - Abnormal    WBC Count 11.5 (*)     RBC Count 5.09      Hemoglobin 16.5      Hematocrit 49.9      MCV 98      MCH 32.4      MCHC 33.1      RDW 13.5      Platelet Count 161      % Neutrophils 87      % Lymphocytes 8      % Monocytes 5      % Eosinophils 0      % Basophils 0      % Immature Granulocytes 0      NRBCs per 100 WBC 0      Absolute Neutrophils 9.9 (*)     Absolute  Lymphocytes 0.9      Absolute Monocytes 0.6      Absolute Eosinophils 0.0      Absolute Basophils 0.0      Absolute Immature Granulocytes 0.1      Absolute NRBCs 0.0     ROUTINE UA WITH MICROSCOPIC REFLEX TO CULTURE - Abnormal    Color Urine Light Yellow      Appearance Urine Clear      Glucose Urine Negative      Bilirubin Urine Negative      Ketones Urine 20  (*)     Specific Gravity Urine 1.005      Blood Urine Large (*)     pH Urine 6.5      Protein Albumin Urine Negative      Urobilinogen Urine Normal      Nitrite Urine Negative      Leukocyte Esterase Urine Negative      RBC Urine 133 (*)     WBC Urine 2     COVID-19 VIRUS (CORONAVIRUS) BY PCR - Normal    SARS CoV2 PCR Negative        Emergency Department Course:    Reviewed:  I reviewed nursing notes, vitals, past medical history and Care Everywhere    Assessments:  0900 I obtained history and examined the patient as noted above.   1155 I rechecked the patient and explained findings.     Consults:  1130  I spoke with Dr. Ho of the Urology service from [facility] regarding patient's presentation, findings, and plan of care.    1153  I spoke with Dr. Cruz of the Hospitalist service from [facility] regarding patient's presentation, findings, and plan of care.    Interventions:  0942 Toradol 15mg IV   1023 NS, 1 L, IV   1155 Ceftriaxone 1g IV   1228 Dilaudid 0.2mg IV         Disposition:  The patient was admitted to the hospital under the care of Dr. Cruz.     Impression & Plan     Medical Decision Making:  Ramesh Galvin is a 61 yr old M presenting to the ED for evaluation of abdominal pain and diarrhea.  VS on presentation reveal elevated BP though otherwise are unremarkable.  History, exam, and ED course as outlined above.  CT imaging performed confirming 2 obstructing distal ureteral calculi measuring 3 mm and 6 mm.  On review of previous records, patient had previously been identified to have a 5 mm mid ureteral stone with hydronephrosis.   His renal function is mildly increased compared with previous, though GFR remains at 48.  His urinalysis demonstrates microscopic hematuria, though negative leukocyte esterase, negative nitrates, and 2 WBCs.  Patient formed of incidentally noted cholelithiasis as well as mild prostatomegaly.  Here in the ER, he was provided the above supportive cares noting improvement in symptoms.  I discussed the case with urology, who recommended ceftriaxone, n.p.o., and admission for observation and likely intervention this afternoon.  Patient updated and in agreement with outlined plan of care.  Questions have been answered prior to admission.    Diagnosis:    ICD-10-CM    1. Ureteral stone  N20.1 Case Request: CYSTOSCOPY, WITH URETERAL STENT INSERTION     Case Request: CYSTOSCOPY, WITH URETERAL STENT INSERTION     Scribe Disclosure:  NILAM, Stas Cisneros, am serving as a scribe at 8:51 AM on 6/18/2022 to document services personally performed by Elan Bell MD based on my observations and the provider's statements to me.          Elan Bell MD  06/18/22 2116

## 2022-06-18 NOTE — ANESTHESIA PREPROCEDURE EVALUATION
Anesthesia Pre-Procedure Evaluation    Patient: Ramesh Galvin   MRN: [id number] : 1960        Procedure : Procedure(s):  CYSTOSCOPY, WITH URETERAL STENT INSERTION          Past Medical History:   Diagnosis Date     AVM (arteriovenous malformation)      Hypercholesteremia      Hypothyroidism     blood work indicated this, no tumor     Psoriasis      Psoriatic arthritis (H)       History reviewed. No pertinent surgical history.   Allergies   Allergen Reactions     Codeine Sulfate       Social History     Tobacco Use     Smoking status: Former Smoker     Packs/day: 1.50     Years: 0.00     Pack years: 0.00     Types: Cigarettes     Quit date: 1981     Years since quittin.4     Smokeless tobacco: Never Used   Substance Use Topics     Alcohol use: Yes     Comment: 1-2 x/month      Wt Readings from Last 1 Encounters:   22 99.8 kg (220 lb)        Anesthesia Evaluation   Pt has had prior anesthetic. Type: General.    No history of anesthetic complications       ROS/MED HX  ENT/Pulmonary:  - neg pulmonary ROS     Neurologic:     (+) Multiple Sclerosis,     Cardiovascular:  - neg cardiovascular ROS     METS/Exercise Tolerance:     Hematologic:  - neg hematologic  ROS     Musculoskeletal:  - neg musculoskeletal ROS     GI/Hepatic:     (+) cholecystitis/cholelithiasis,     Renal/Genitourinary:     (+) Nephrolithiasis ,     Endo:     (+) thyroid problem, hypothyroidism, Obesity,     Psychiatric/Substance Use:  - neg psychiatric ROS     Infectious Disease:  - neg infectious disease ROS     Malignancy:  - neg malignancy ROS     Other:  - neg other ROS          Physical Exam    Airway        Mallampati: II   TM distance: > 3 FB   Neck ROM: full   Mouth opening: > 3 cm    Respiratory Devices and Support         Dental  no notable dental history         Cardiovascular   cardiovascular exam normal          Pulmonary   pulmonary exam normal                OUTSIDE LABS:  CBC:   Lab Results   Component Value  Date    WBC 11.5 (H) 06/18/2022    WBC 7.7 04/03/2013    HGB 16.5 06/18/2022    HGB 16.4 04/03/2013    HCT 49.9 06/18/2022    HCT 50.3 04/03/2013     06/18/2022     04/03/2013     BMP:   Lab Results   Component Value Date     06/18/2022    POTASSIUM 4.4 06/18/2022    CHLORIDE 105 06/18/2022    CO2 26 06/18/2022    BUN 25 06/18/2022    CR 1.60 (H) 06/18/2022     (H) 06/18/2022     COAGS: No results found for: PTT, INR, FIBR  POC: No results found for: BGM, HCG, HCGS  HEPATIC:   Lab Results   Component Value Date    ALBUMIN 3.8 06/18/2022    PROTTOTAL 8.5 06/18/2022    ALT 24 06/18/2022    AST 24 06/18/2022    ALKPHOS 70 06/18/2022    BILITOTAL 0.7 06/18/2022     OTHER:   Lab Results   Component Value Date    LANCE 9.4 06/18/2022       Anesthesia Plan    ASA Status:  3   NPO Status:  NPO Appropriate    Anesthesia Type: General.     - Airway: LMA   Induction: Inhalational.   Maintenance: Balanced.        Consents            Postoperative Care    Pain management: IV analgesics, Oral pain medications, Multi-modal analgesia.   PONV prophylaxis: Ondansetron (or other 5HT-3), Dexamethasone or Solumedrol     Comments:                Magnus Conteh MD

## 2022-06-18 NOTE — ANESTHESIA CARE TRANSFER NOTE
Patient: Ramesh Galvin    Procedure: Procedure(s):  CYSTOSCOPY, WITH URETERAL STENT INSERTION       Diagnosis: Ureteral stone [N20.1]  Diagnosis Additional Information: No value filed.    Anesthesia Type:   General     Note:    Oropharynx: oropharynx clear of all foreign objects  Level of Consciousness: awake  Oxygen Supplementation: face mask    Independent Airway: airway patency satisfactory and stable  Dentition: dentition unchanged  Vital Signs Stable: post-procedure vital signs reviewed and stable  Report to RN Given: handoff report given  Patient transferred to: PACU    Handoff Report: Identifed the Patient, Identified the Reponsible Provider, Reviewed the pertinent medical history, Discussed the surgical course, Reviewed Intra-OP anesthesia mangement and issues during anesthesia, Set expectations for post-procedure period and Allowed opportunity for questions and acknowledgement of understanding      Vitals:  Vitals Value Taken Time   /90 06/18/22 1550   Temp     Pulse 80 06/18/22 1551   Resp 11 06/18/22 1551   SpO2 100 % 06/18/22 1551   Vitals shown include unvalidated device data.    Electronically Signed By: LYNN Paz CRNA  June 18, 2022  3:52 PM

## 2022-06-18 NOTE — PLAN OF CARE
Goal Outcome Evaluation:    Plan of Care Reviewed With: patient     Overall Patient Progress: improving  [id number]  Pt up from PACU at 1800, able to ambulate from cart to bed. Hanson in place and draining light pink urine. IV fluids at 100/hr. Afebrile, VSS, capnography in place. Flomax given. Will strain urine and continue post op cares post stent placement.  Pt aware of plan of care, resting comfortably and watching TV, will have cl liqs and a few bites of solids this evening.

## 2022-06-18 NOTE — ANESTHESIA POSTPROCEDURE EVALUATION
Patient: Ramesh Galvin    Procedure: Procedure(s):  CYSTOSCOPY, WITH URETERAL STENT INSERTION       Anesthesia Type:  General    Note:     Postop Pain Control: Uneventful            Sign Out: Well controlled pain   PONV: No   Neuro/Psych: Uneventful            Sign Out: Acceptable/Baseline neuro status   Airway/Respiratory: Uneventful            Sign Out: Acceptable/Baseline resp. status   CV/Hemodynamics: Uneventful            Sign Out: Acceptable CV status   Other NRE: NONE   DID A NON-ROUTINE EVENT OCCUR? No           Last vitals:  Vitals Value Taken Time   /82 06/18/22 1610   Temp 96.4  F (35.8  C) 06/18/22 1550   Pulse 79 06/18/22 1613   Resp 19 06/18/22 1613   SpO2 100 % 06/18/22 1613   Vitals shown include unvalidated device data.    Electronically Signed By: Magnus Conteh MD  June 18, 2022  4:14 PM

## 2022-06-18 NOTE — H&P
[facility]  Outpatient/Observation Unit  Admission History and Physical Examination    Date of service: 2022   Patient name: Ramesh Galvin   : 1960   MRN: [id number]     Informant: Electronic Health Record, ED Provider, and patient as they are capable    Chief Complaint   Patient presents with     Abdominal Pain     Diarrhea       Assessment/Plan:    Right Ureteral kidney stone with Right Hydronephrosis    Assessment: Patient presented with lower abdominal pain 8/10 that started last night PTA. History of recent Hematuria with urology appt on 22- opted for non-surgical management at that time. In ED, VSS. Labs with Cr 1.60, , WBC 11.5. UA with large blood and 20 ketones. CT abdomen with 2 obstructing calculi adjacent to each other in the right distal ureter measuring 3 mm and 6 mm with moderate right hydroureteronephrosis and perinephric stranding.  Adjacent small non obstructing right renal calculus    Plan:     Urology consultation    NPO now for lithotripsy later today    Aggressive IVF replacement with straining urine     Supportive care with pain control with Toradol and antiemesis with Zofran    Start Flomax     Am labs: WBC and BMP    Acute Kidney Injury   Assessment: Cr 1.60 with baseline Cr around 1.1-1.2.    Plan:     IVF hydration and recheck in AM    Multiple sclerosis (H)    Assessment: noted. Not currently having flare up.       Psoriatic arthritis    Assessment: Noted.  Takes Methotrexate at home.     Plan:     Hold methotrexate       Obesity (BMI 30-39.9)   Known condition at baseline. Patient has increased morbidity and mortality from associated conditions as consequence of obesity such as hypertension, diabetes mellitus (type II), hyperlipidemia, coronary artery disease, degenerative joint disease, sleep apnea, stetohepatitis, cancer, and psychosocial disorders.    Encourage healthy lifestyle and eating    Warrants out patient evaluation and counseling  if not already performed      [unfilled]Advanced Care Planning  Code Status: Full code  Diet:   NPO for anticipated intervention later in the evening  DVT Prophylaxis: None- ambulatory  Hanson Catheter: Not present  Central Lines: None  Cardiac Monitoring: None    Observation    Teena Cramer DNP, NP-C  Hospitalist Service  [facility]  Securely message with the Vocera Web Console (learn more here)  Text page via [employer] Paging/Directory     HPI:    Ramesh Galvin is a 61 year old male with a history of psoriatic arthritis, hypercholesterolemia, multiple sclerosis who presented to the ED with right lower abdominal pain.  Hx was obtained by speaking with the Emergency Room physician, chart review and interview with the patient.     Patient presents with acute lower abdominal pain 8 out of 10 that started last night.  He reports that he was unable to get any sleep due to pain.  Denies any hematuria.    Of note, he was recently seen by urologist about a month ago on 5/11/2022 for hematuria.  He had CT urogram which showed 2 nonobstructive stones in the kidneys and obstructing stone in the right proximal ureter.  He opted for nonsurgical intervention at that time.    Patient denies nausea, vomiting or diarrhea. Denies cough, chest pain, or shortness of breath and no reported headaches or vision changes. Denies any urinary symptoms of dysuria or frequency and is afebrile without chills. Patient reports no recent long distance travel, camping or sick changes.      ED course: In ED, VSS. Labs with Cr 1.60, , WBC 11.5. UA with large blood and 20 ketones. CT abdomen with 2 obstructing calculi adjacent to each other in the right distal ureter measuring 3 mm and 6 mm with moderate right hydroureteronephrosis and perinephric stranding.  Adjacent small non obstructing right renal calculus.  Given ceftriaxone, Dilaudid, Toradol.  ED provider discussed case with urologist who plan to perform intervention  later in the day.    SUMMARY OF PRESENTING SYMPTOMS  Location: Lower abdomen  Severity: Moderate to severe  Timin day PTA  Modifying factors: None  Quality: Sharp  Duration: Intermittent  Context: Kidney stones  Associated signs and symptoms: Pain    REVIEW OF SYSTEMS:   10 point ROS negative other than the symptoms noted above.    History:       Allergies   Allergen Reactions     Codeine Sulfate        Past Medical History:   Diagnosis Date     AVM (arteriovenous malformation)      Hypercholesteremia      Hypothyroidism     blood work indicated this, no tumor     Psoriasis      Psoriatic arthritis (H)        No past surgical history on file.    Family History   Problem Relation Age of Onset     Cancer Father         lymphatic     Cancer Maternal Grandfather         lung        Social History     Socioeconomic History     Marital status:      Spouse name: Ruben     Number of children: 0     Years of education: Not on file     Highest education level: Not on file   Occupational History     Occupation:    Tobacco Use     Smoking status: Former Smoker     Packs/day: 1.50     Years: 0.00     Pack years: 0.00     Types: Cigarettes     Quit date: 1981     Years since quittin.4     Smokeless tobacco: Not on file   Substance and Sexual Activity     Alcohol use: Yes     Comment: 1-2 x/month     Drug use: No     Sexual activity: Not on file   Other Topics Concern     Not on file   Social History Narrative     Not on file     Social Determinants of Health     Financial Resource Strain: Not on file   Food Insecurity: Not on file   Transportation Needs: Not on file   Physical Activity: Not on file   Stress: Not on file   Social Connections: Not on file   Intimate Partner Violence: Not on file   Housing Stability: Not on file       No current facility-administered medications on file prior to encounter.  LEVOTHYROXINE SODIUM PO, Take 1 tablet by mouth daily.  Methotrexate Sodium 2.5 MG TABS, Take  8 tablets by mouth once a week.  ORDER FOR DME, Equipment being ordered: Lift chair      Exam:    Vitals:  BP (!) 157/99   Pulse 64   Temp 97.3  F (36.3  C) (Temporal)   Resp 20   SpO2 95%     No intake or output data in the 24 hours ending 06/18/22 1241    General: NAD, well nourished, well developed, good hygiene, in no acute distress, alert and oriented X3  HEENT: Atraumatic, normocephalic, No scleral icterus or ptosis. No nasal discharges. Normal hearing. External ears are normal. Moist mucus membranes. No oral lesions.   Neck: No JVD, thyromegaly, lymphadenopathy, or bruits.   Cardio: RRR, positive S1, S2, no M/R/G.   Lungs: CTAB, no W/R/R, equal expansion, no labored breathing, no respiratory distress,  no increased WOB, stridor, pursed lip or accessory muscle use, talking in full sentences  Abdomen: NT, + BS x4.  Mild tenderness with palpation to lower abdomen  Extremities: Warm and well perfused.  No edema, cyanosis, clubbing  Skin: Visualized skin is within normal limits, no rashes, ulcerations or petechiae  Pulses: Dorsalis pedis, posterior tibial: 2+ easily palpable.  Good capillary refill  Psychiatry: Normal affect, normal speech, judgment and insight are normal  Neuro: No focal deficits.  Motor strength is appropriate for age     Data:  Data reviewed today: I reviewed all medications, new labs and imaging results over the last 24 hours.    Results for orders placed or performed during the hospital encounter of 06/18/22   CT Abdomen Pelvis w Contrast     Status: None    Narrative    EXAM: CT ABDOMEN PELVIS W CONTRAST  LOCATION: [facility]  DATE/TIME: 6/18/2022 9:20 AM    INDICATION: RLQ abdominal pain, appendicitis suspected (Age >= 14y)  COMPARISON: None.  TECHNIQUE: CT scan of the abdomen and pelvis was performed following injection of IV contrast. Multiplanar reformats were obtained. Dose reduction techniques were used.  CONTRAST: 90mL Isovue 370    FINDINGS:   LOWER CHEST:  The visualized lung bases are clear. Small hiatal hernia.    HEPATOBILIARY: No focal lesions in the liver. Cholelithiasis.    PANCREAS: Normal.    SPLEEN: Normal.    ADRENAL GLANDS: Normal.    KIDNEYS/BLADDER: Two obstructing calculi adjacent to each other in the right distal ureter measuring 3 mm and 6 mm (sagittal series 5, image 66 and axial series 3, image [id number]). Associated moderate right hydronephrosis and hydroureter and perinephric   stranding. Additional nonobstructing right renal calculus measuring 4 mm. No right renal masses. Left renal simple cyst requiring no specific follow-up. No left renal calculi, masses or hydronephrosis. Partially distended urinary bladder without focal   abnormality.    BOWEL: Normal with no obstruction or acute inflammatory change. Nothing for appendicitis.    LYMPH NODES: No lymphadenopathy.    VASCULATURE: No abdominal aortic aneurysm.    PELVIC ORGANS: Mild prostatomegaly with the median lobe indenting the urinary bladder base. No free fluid or fluid collections.    MUSCULOSKELETAL: Unremarkable.      Impression    IMPRESSION:   1.  Two obstructing calculi adjacent to each other in the right distal ureter measuring 3 mm and 6 mm with moderate right hydroureteronephrosis and perinephric stranding. Adjacent small nonobstructing right renal calculus.  2.  Cholelithiasis.   Comprehensive metabolic panel     Status: Abnormal   Result Value Ref Range    Sodium 136 133 - 144 mmol/L    Potassium 4.4 3.4 - 5.3 mmol/L    Chloride 105 94 - 109 mmol/L    Carbon Dioxide (CO2) 26 20 - 32 mmol/L    Anion Gap 5 3 - 14 mmol/L    Urea Nitrogen 25 7 - 30 mg/dL    Creatinine 1.60 (H) 0.66 - 1.25 mg/dL    Calcium 9.4 8.5 - 10.1 mg/dL    Glucose 123 (H) 70 - 99 mg/dL    Alkaline Phosphatase 70 40 - 150 U/L    AST 24 0 - 45 U/L    ALT 24 0 - 70 U/L    Protein Total 8.5 6.8 - 8.8 g/dL    Albumin 3.8 3.4 - 5.0 g/dL    Bilirubin Total 0.7 0.2 - 1.3 mg/dL    GFR Estimate 48 (L) >60 mL/min/1.73m2    CBC with platelets and differential     Status: Abnormal   Result Value Ref Range    WBC Count 11.5 (H) 4.0 - 11.0 10e3/uL    RBC Count 5.09 4.40 - 5.90 10e6/uL    Hemoglobin 16.5 13.3 - 17.7 g/dL    Hematocrit 49.9 40.0 - 53.0 %    MCV 98 78 - 100 fL    MCH 32.4 26.5 - 33.0 pg    MCHC 33.1 31.5 - 36.5 g/dL    RDW 13.5 10.0 - 15.0 %    Platelet Count 161 150 - 450 10e3/uL    % Neutrophils 87 %    % Lymphocytes 8 %    % Monocytes 5 %    % Eosinophils 0 %    % Basophils 0 %    % Immature Granulocytes 0 %    NRBCs per 100 WBC 0 <1 /100    Absolute Neutrophils 9.9 (H) 1.6 - 8.3 10e3/uL    Absolute Lymphocytes 0.9 0.8 - 5.3 10e3/uL    Absolute Monocytes 0.6 0.0 - 1.3 10e3/uL    Absolute Eosinophils 0.0 0.0 - 0.7 10e3/uL    Absolute Basophils 0.0 0.0 - 0.2 10e3/uL    Absolute Immature Granulocytes 0.1 <=0.4 10e3/uL    Absolute NRBCs 0.0 10e3/uL   UA with Microscopic reflex to Culture     Status: Abnormal    Specimen: Urine, Midstream   Result Value Ref Range    Color Urine Light Yellow Colorless, Straw, Light Yellow, Yellow    Appearance Urine Clear Clear    Glucose Urine Negative Negative mg/dL    Bilirubin Urine Negative Negative    Ketones Urine 20  (A) Negative mg/dL    Specific Gravity Urine 1.005 1.003 - 1.035    Blood Urine Large (A) Negative    pH Urine 6.5 5.0 - 7.0    Protein Albumin Urine Negative Negative mg/dL    Urobilinogen Urine Normal Normal, 2.0 mg/dL    Nitrite Urine Negative Negative    Leukocyte Esterase Urine Negative Negative    RBC Urine 133 (H) <=2 /HPF    WBC Urine 2 <=5 /HPF    Narrative    Urine Culture not indicated   CBC with platelets differential     Status: Abnormal    Narrative    The following orders were created for panel order CBC with platelets differential.  Procedure                               Abnormality         Status                     ---------                               -----------         ------                     CBC with platelets and d...[[id number]]  Abnormal             Final result                 Please view results for these tests on the individual orders.      Labs:  Recent Labs   Lab 06/18/22  0900      POTASSIUM 4.4   CHLORIDE 105   CO2 26   ANIONGAP 5   *   BUN 25   CR 1.60*   GFRESTIMATED 48*   LANCE 9.4     Recent Labs   Lab 06/18/22  0900   WBC 11.5*   HGB 16.5   HCT 49.9   MCV 98        Recent Labs   Lab 06/18/22  1008   COLOR Light Yellow   APPEARANCE Clear   URINEGLC Negative   URINEBILI Negative   URINEKETONE 20 *   SG 1.005   UBLD Large*   URINEPH 6.5   PROTEIN Negative   NITRITE Negative   LEUKEST Negative   RBCU 133*   WBCU 2        Imaging:   Recent Results (from the past 24 hour(s))   CT Abdomen Pelvis w Contrast    Narrative    EXAM: CT ABDOMEN PELVIS W CONTRAST  LOCATION: [facility]  DATE/TIME: 6/18/2022 9:20 AM    INDICATION: RLQ abdominal pain, appendicitis suspected (Age >= 14y)  COMPARISON: None.  TECHNIQUE: CT scan of the abdomen and pelvis was performed following injection of IV contrast. Multiplanar reformats were obtained. Dose reduction techniques were used.  CONTRAST: 90mL Isovue 370    FINDINGS:   LOWER CHEST: The visualized lung bases are clear. Small hiatal hernia.    HEPATOBILIARY: No focal lesions in the liver. Cholelithiasis.    PANCREAS: Normal.    SPLEEN: Normal.    ADRENAL GLANDS: Normal.    KIDNEYS/BLADDER: Two obstructing calculi adjacent to each other in the right distal ureter measuring 3 mm and 6 mm (sagittal series 5, image 66 and axial series 3, image [id number]). Associated moderate right hydronephrosis and hydroureter and perinephric   stranding. Additional nonobstructing right renal calculus measuring 4 mm. No right renal masses. Left renal simple cyst requiring no specific follow-up. No left renal calculi, masses or hydronephrosis. Partially distended urinary bladder without focal   abnormality.    BOWEL: Normal with no obstruction or acute inflammatory change. Nothing for  appendicitis.    LYMPH NODES: No lymphadenopathy.    VASCULATURE: No abdominal aortic aneurysm.    PELVIC ORGANS: Mild prostatomegaly with the median lobe indenting the urinary bladder base. No free fluid or fluid collections.    MUSCULOSKELETAL: Unremarkable.      Impression    IMPRESSION:   1.  Two obstructing calculi adjacent to each other in the right distal ureter measuring 3 mm and 6 mm with moderate right hydroureteronephrosis and perinephric stranding. Adjacent small nonobstructing right renal calculus.  2.  Cholelithiasis.

## 2022-06-19 VITALS
RESPIRATION RATE: 16 BRPM | SYSTOLIC BLOOD PRESSURE: 140 MMHG | HEART RATE: 53 BPM | OXYGEN SATURATION: 97 % | TEMPERATURE: 97.6 F | BODY MASS INDEX: 33.34 KG/M2 | DIASTOLIC BLOOD PRESSURE: 89 MMHG | HEIGHT: 68 IN | WEIGHT: 220 LBS

## 2022-06-19 LAB
ANION GAP SERPL CALCULATED.3IONS-SCNC: 5 MMOL/L (ref 3–14)
BUN SERPL-MCNC: 20 MG/DL (ref 7–30)
CALCIUM SERPL-MCNC: 8.8 MG/DL (ref 8.5–10.1)
CHLORIDE BLD-SCNC: 106 MMOL/L (ref 94–109)
CO2 SERPL-SCNC: 25 MMOL/L (ref 20–32)
CREAT SERPL-MCNC: 1.24 MG/DL (ref 0.66–1.25)
GFR SERPL CREATININE-BSD FRML MDRD: 66 ML/MIN/1.73M2
GLUCOSE BLD-MCNC: 152 MG/DL (ref 70–99)
POTASSIUM BLD-SCNC: 4.5 MMOL/L (ref 3.4–5.3)
SODIUM SERPL-SCNC: 136 MMOL/L (ref 133–144)

## 2022-06-19 PROCEDURE — 258N000003 HC RX IP 258 OP 636: Performed by: NURSE PRACTITIONER

## 2022-06-19 PROCEDURE — 82310 ASSAY OF CALCIUM: CPT | Performed by: NURSE PRACTITIONER

## 2022-06-19 PROCEDURE — 36415 COLL VENOUS BLD VENIPUNCTURE: CPT | Performed by: NURSE PRACTITIONER

## 2022-06-19 PROCEDURE — 99217 PR OBSERVATION CARE DISCHARGE: CPT | Performed by: INTERNAL MEDICINE

## 2022-06-19 PROCEDURE — 250N000013 HC RX MED GY IP 250 OP 250 PS 637: Performed by: NURSE PRACTITIONER

## 2022-06-19 PROCEDURE — G0378 HOSPITAL OBSERVATION PER HR: HCPCS

## 2022-06-19 RX ORDER — SULFAMETHOXAZOLE/TRIMETHOPRIM 800-160 MG
1 TABLET ORAL 2 TIMES DAILY
Qty: 12 TABLET | Refills: 0 | Status: SHIPPED | OUTPATIENT
Start: 2022-06-19 | End: 2022-06-19

## 2022-06-19 RX ORDER — SULFAMETHOXAZOLE/TRIMETHOPRIM 800-160 MG
1 TABLET ORAL 2 TIMES DAILY
Qty: 7 TABLET | Refills: 0 | Status: SHIPPED | OUTPATIENT
Start: 2022-06-19 | End: 2022-06-19

## 2022-06-19 RX ORDER — SULFAMETHOXAZOLE/TRIMETHOPRIM 800-160 MG
1 TABLET ORAL 2 TIMES DAILY
Qty: 12 TABLET | Refills: 0 | Status: ON HOLD | OUTPATIENT
Start: 2022-06-19 | End: 2022-07-02

## 2022-06-19 RX ORDER — TAMSULOSIN HYDROCHLORIDE 0.4 MG/1
0.4 CAPSULE ORAL DAILY
Qty: 7 CAPSULE | Refills: 0 | Status: ON HOLD | OUTPATIENT
Start: 2022-06-20 | End: 2022-07-01

## 2022-06-19 RX ORDER — METHOTREXATE 2.5 MG/1
10 TABLET ORAL WEEKLY
Qty: 1 TABLET | Refills: 0
Start: 2022-06-19

## 2022-06-19 RX ORDER — TAMSULOSIN HYDROCHLORIDE 0.4 MG/1
0.4 CAPSULE ORAL DAILY
Qty: 7 CAPSULE | Refills: 0 | Status: SHIPPED | OUTPATIENT
Start: 2022-06-20 | End: 2022-06-19

## 2022-06-19 RX ADMIN — SODIUM CHLORIDE: 9 INJECTION, SOLUTION INTRAVENOUS at 03:42

## 2022-06-19 RX ADMIN — TAMSULOSIN HYDROCHLORIDE 0.4 MG: 0.4 CAPSULE ORAL at 08:17

## 2022-06-19 NOTE — PROVIDER NOTIFICATION
1940 - Hospitalist service paged requesting order for aceves (per urology note to keep in overnight) and if ABX are being continued overnight./ awaiting response.     Per Hospitalist, aceves order placed. ABX order will be deferred to AM team. Will continue to monitor.

## 2022-06-19 NOTE — DISCHARGE SUMMARY
[facility]  Hospitalist Discharge Summary      Date of Admission:  6/18/2022  Date of Discharge:  6/19/2022  Discharging Provider: Brandon Brown MD  Discharge Service: Hospitalist Service    Discharge Diagnoses         Follow-ups Needed After Discharge   Follow-up Appointments     Follow-up and recommended labs and tests       Follow up with primary care provider, Gee Michaelsabrina Javier, within   7 days for hospital follow- up.  No follow up labs or test are needed.  Follow up with urology as scheduled.             Unresulted Labs Ordered in the Past 30 Days of this Admission     Date and Time Order Name Status Description    6/18/2022  3:49 PM Fungal or Yeast Culture Routine In process     6/18/2022  3:49 PM Urine Culture In process       These results will be followed up by PCP.    Discharge Disposition   Discharged to home  Condition at discharge: Stable      Hospital Course      Ramesh Galvin is a 61 year old male with a history of psoriatic arthritis, hypercholesterolemia, multiple sclerosis who presented to the ED with right lower abdominal pain.  Hx was obtained by speaking with the Emergency Room physician, chart review and interview with the patient.      Patient presents with acute lower abdominal pain 8 out of 10 that started last night.  He reports that he was unable to get any sleep due to pain.  Denies any hematuria.     Of note, he was recently seen by urologist about a month ago on 5/11/2022 for hematuria.  He had CT urogram which showed 2 nonobstructive stones in the kidneys and obstructing stone in the right proximal ureter.  He opted for nonsurgical intervention at that time.     Patient denies nausea, vomiting or diarrhea. Denies cough, chest pain, or shortness of breath and no reported headaches or vision changes. Denies any urinary symptoms of dysuria or frequency and is afebrile without chills. Patient reports no recent long distance travel, camping or sick  changes.       ED course: In ED, VSS. Labs with Cr 1.60, , WBC 11.5. UA with large blood and 20 ketones. CT abdomen with 2 obstructing calculi adjacent to each other in the right distal ureter measuring 3 mm and 6 mm with moderate right hydroureteronephrosis and perinephric stranding.  Adjacent small non obstructing right renal calculus.  Given ceftriaxone, Dilaudid, Toradol.  ED provider discussed case with urologist who plan to perform intervention later in the day.    S/p cysto with R ureteral stent placement. Afebrile. Hanson out. Will plan on discharge with PO bactrim and hold methotrexate whilst on bactrim.       Consultations This Hospital Stay   CARE MANAGEMENT / SOCIAL WORK IP CONSULT  UROLOGY IP CONSULT    Code Status   Full Code    Time Spent on this Encounter   I, Brandon Brown MD, personally saw the patient today and spent less than or equal to 30 minutes discharging this patient.       Brandon Brown MD  [facility]  [address] [place] [address]  Phone: [phone]  Fax: [phone]  ______________________________________________________________________    Physical Exam   Vital Signs: Temp: 97.6  F (36.4  C) Temp src: Oral BP: (!) 140/89 Pulse: 53   Resp: 16 SpO2: 97 %   Oxygen Delivery: 4 LPM  Weight: 220 lbs 0 oz    Gen - AAO x 3 in NAD.  Lungs - CTA B.  Heart - RR,S1+S2 nml, no m/g/r.  Abd - soft, NT, ND, + BS.       Primary Care Physician   [facility]    Discharge Orders      Follow-up and recommended labs and tests     Follow up with primary care provider, [facility], within 7 days for hospital follow- up.  No follow up labs or test are needed.  Follow up with urology as scheduled.     Activity    Your activity upon discharge: activity as tolerated     Diet    Follow this diet upon discharge: Orders Placed This Encounter      Advance Diet as Tolerated: Regular Diet Adult       Significant Results and Procedures    Results for orders placed or performed during the hospital encounter of 06/18/22   CT Abdomen Pelvis w Contrast    Narrative    EXAM: CT ABDOMEN PELVIS W CONTRAST  LOCATION: [facility]  DATE/TIME: 6/18/2022 9:20 AM    INDICATION: RLQ abdominal pain, appendicitis suspected (Age >= 14y)  COMPARISON: None.  TECHNIQUE: CT scan of the abdomen and pelvis was performed following injection of IV contrast. Multiplanar reformats were obtained. Dose reduction techniques were used.  CONTRAST: 90mL Isovue 370    FINDINGS:   LOWER CHEST: The visualized lung bases are clear. Small hiatal hernia.    HEPATOBILIARY: No focal lesions in the liver. Cholelithiasis.    PANCREAS: Normal.    SPLEEN: Normal.    ADRENAL GLANDS: Normal.    KIDNEYS/BLADDER: Two obstructing calculi adjacent to each other in the right distal ureter measuring 3 mm and 6 mm (sagittal series 5, image 66 and axial series 3, image [id number]). Associated moderate right hydronephrosis and hydroureter and perinephric   stranding. Additional nonobstructing right renal calculus measuring 4 mm. No right renal masses. Left renal simple cyst requiring no specific follow-up. No left renal calculi, masses or hydronephrosis. Partially distended urinary bladder without focal   abnormality.    BOWEL: Normal with no obstruction or acute inflammatory change. Nothing for appendicitis.    LYMPH NODES: No lymphadenopathy.    VASCULATURE: No abdominal aortic aneurysm.    PELVIC ORGANS: Mild prostatomegaly with the median lobe indenting the urinary bladder base. No free fluid or fluid collections.    MUSCULOSKELETAL: Unremarkable.      Impression    IMPRESSION:   1.  Two obstructing calculi adjacent to each other in the right distal ureter measuring 3 mm and 6 mm with moderate right hydroureteronephrosis and perinephric stranding. Adjacent small nonobstructing right renal calculus.  2.  Cholelithiasis.   XR Surgery LEAH L/T 5 Min Fluoro w Stills    Narrative     This exam was marked as non-reportable because it will not be read by a   radiologist or a [place] non-radiologist provider.               Discharge Medications   Current Discharge Medication List      START taking these medications    Details   sulfamethoxazole-trimethoprim (BACTRIM DS) 800-160 MG tablet Take 1 tablet by mouth 2 times daily for 6 days  Qty: 12 tablet, Refills: 0    Associated Diagnoses: Ureteral stone      tamsulosin (FLOMAX) 0.4 MG capsule Take 1 capsule (0.4 mg) by mouth daily for 7 days  Qty: 7 capsule, Refills: 0    Associated Diagnoses: Ureteral stone         CONTINUE these medications which have CHANGED    Details   methotrexate sodium 2.5 MG TABS Take 10 tablets (25 mg) by mouth once a week On Mondays  Qty: 1 tablet, Refills: 0    Comments: Skip methotrexate 6/20 and resume 6/27.  Associated Diagnoses: Psoriatic arthritis (H)         CONTINUE these medications which have NOT CHANGED    Details   cyclobenzaprine (FLEXERIL) 10 MG tablet Take 10 mg by mouth 3 times daily as needed for muscle spasms      folic acid (FOLVITE) 1 MG tablet Take 1 tablet by mouth daily      predniSONE (DELTASONE) 10 MG tablet Take 10 mg by mouth daily Take 15 mg for 3-5 days, then taper by 5 mg every 3-5 days until off.      apremilast (OTEZLA) 30 MG tablet Take 1 tablet by mouth 2 times daily      ORDER FOR DME Equipment being ordered: Lift chair  Qty: 1 each, Refills: 0    Associated Diagnoses: Multiple sclerosis (H)         STOP taking these medications       aspirin (ASA) 325 MG tablet Comments:   Reason for Stopping:             Allergies   Allergies   Allergen Reactions     Codeine Sulfate

## 2022-06-19 NOTE — PLAN OF CARE
Goal Outcome Evaluation:        Vital Signs: WNL.   Pain/Comfort: patient stating that he is in no pain at this time. Patient encouraged to call RN if patient starts having increased pain. Patient stated an understanding.   Assessment: PIV site c/d/i with IVF infusing per MAR.   Diet: patient tolerating PO intake. Fluids encouraged as tolerated.   Output: aceves in placed an draining light pink urine. No clots noted. Urine strained - no clots noted.   Activity/Ambulation: patient up to bathroom with SBA.   Social: patient calm and cooperative   Plan: Pain control. Maintain PIV. IVF. Monitor I&O. Maintain aceves. Possible discharge pending voiding after aceves removal. Will continue to monitor and will notify service with any questions or concerns.             0400 - patient stating aceves is uncomfortable and asking when aceves can be removed. MD paged. OK to removed aceves per MD.      0500 - aceves removed per MD orders and patient request. Patient tolerated removal well. Patient informed that he will need to void within 4 hours of aceves being removed (about 9 am). Patient stated an understanding. Patient with a few small drips of blood after aceves removed. Will continue to monitor.

## 2022-06-19 NOTE — PROGRESS NOTES
Cross cover notified patient requesting Hanson removal due to discomfort.  Reviewed urology op note.  Plans to remove Hanson this morning if otherwise doing well.  Slight pink tinge to the urine following his procedure, but no dark red blood or clots.  -Remove Hanson catheter and bladder scan as needed if not able to void

## 2022-06-19 NOTE — PLAN OF CARE
Vss afebrile. C/o pain 2/10, denying the needs for pain meds. Voiding dark haider urine. Iv removed. Tolerating regular diet.   Discharge instructions reviewed with pt. Pt understood all discharge instructions, meds, f/U Appts needed and s/s of when to call the dr. Clean urinal and strainer given to pt for home as well as spec cup if he would pass the stone. 2 meds sent to [facility] for pt to  later.     Plan of Care Reviewed With: patient

## 2022-06-19 NOTE — PHARMACY-ADMISSION MEDICATION HISTORY
Admission medication history interview status for this patient is complete. See [facility] admission navigator for allergy information, prior to admission medications and immunization status.     Medication history interview done, indicate source(s): Patient  Medication history resources (including written lists, pill bottles, clinic record):[facility] med list  Pharmacy: [facility]    Changes made to [facility] medication list:  Added: ASA  Changed: Methotrexate to 25 mg  Reported as Not Taking: none  Removed: levothyroxine    Actions taken by pharmacist (provider contacted, etc):None     Additional medication history information:None    Medication reconciliation/reorder completed by provider prior to medication history?  No   (Y/N)         Prior to Admission medications    Medication Sig Last Dose Taking? Auth Provider Long Term End Date   aspirin (ASA) 325 MG tablet Take 325 mg by mouth every 6 hours as needed for moderate pain 6/17/2022 at Unknown time Yes Unknown, Entered By History     cyclobenzaprine (FLEXERIL) 10 MG tablet Take 10 mg by mouth 3 times daily as needed for muscle spasms 6/18/2022 at Unknown time Yes Unknown, Entered By History     folic acid (FOLVITE) 1 MG tablet Take 1 tablet by mouth daily 6/16/2022 at am Yes Unknown, Entered By History     Methotrexate Sodium 2.5 MG TABS Take 10 tablets by mouth once a week On Mondays 6/13/2022 at am Yes Reported, Patient     predniSONE (DELTASONE) 10 MG tablet Take 10 mg by mouth daily Take 15 mg for 3-5 days, then taper by 5 mg every 3-5 days until off. More than a month at Unknown time Yes Unknown, Entered By History     apremilast (OTEZLA) 30 MG tablet Take 1 tablet by mouth 2 times daily  at pm  Unknown, Entered By History     ORDER FOR DME Equipment being ordered: Lift chair   Cabrera Magaña DO

## 2022-06-20 LAB — BACTERIA UR CULT: NO GROWTH

## 2022-06-22 LAB — BACTERIA UR CULT: NO GROWTH

## 2022-06-23 DIAGNOSIS — N20.1 URETERAL STONE: Primary | ICD-10-CM

## 2022-06-24 ENCOUNTER — HOSPITAL ENCOUNTER (OUTPATIENT)
Facility: CLINIC | Age: 62
End: 2022-06-24
Attending: UROLOGY | Admitting: UROLOGY
Payer: COMMERCIAL

## 2022-06-28 ENCOUNTER — TELEPHONE (OUTPATIENT)
Dept: UROLOGY | Facility: CLINIC | Age: 62
End: 2022-06-28

## 2022-06-28 ENCOUNTER — LAB (OUTPATIENT)
Dept: LAB | Facility: CLINIC | Age: 62
End: 2022-06-28
Payer: COMMERCIAL

## 2022-06-28 DIAGNOSIS — Z01.818 PREOPERATIVE EXAMINATION: ICD-10-CM

## 2022-06-28 DIAGNOSIS — Z01.818 PREOPERATIVE EXAMINATION: Primary | ICD-10-CM

## 2022-06-28 LAB — SARS-COV-2 RNA RESP QL NAA+PROBE: NEGATIVE

## 2022-06-28 PROCEDURE — U0003 INFECTIOUS AGENT DETECTION BY NUCLEIC ACID (DNA OR RNA); SEVERE ACUTE RESPIRATORY SYNDROME CORONAVIRUS 2 (SARS-COV-2) (CORONAVIRUS DISEASE [COVID-19]), AMPLIFIED PROBE TECHNIQUE, MAKING USE OF HIGH THROUGHPUT TECHNOLOGIES AS DESCRIBED BY CMS-2020-01-R: HCPCS

## 2022-06-28 PROCEDURE — U0005 INFEC AGEN DETEC AMPLI PROBE: HCPCS

## 2022-06-28 NOTE — TELEPHONE ENCOUNTER
Patient is scheduled for procedure with Dr. Ho     Spoke with: Patient via patient      Date of Surgery: Friday July 01, 2022     Location: [facility] OR      Informed patient they will need an adult : Yes     Pre-op: Yes      H&P: Patient completed      Pre-procedure COVID-19 Test: Tuesday June 28, 2022 at [facility]     Post-op: TBD    Additional imaging/appointments:     Additional comments:      Surgery packet: N/A    Patient is aware that surgery time is tentative to change and to expect a call 3-1 business days from Pre Admission Nursing for instructions and arrival time

## 2022-06-28 NOTE — TELEPHONE ENCOUNTER
----- Message from Jina Amador sent at 6/28/2022  9:18 AM CDT -----  Could you please place a stat Covid order for this patient, he's having surgery with [place] on 7/1 I am going to have him do his test today.

## 2022-06-28 NOTE — TELEPHONE ENCOUNTER
----- Message from Jina Amador sent at 6/27/2022  9:08 AM CDT -----  Odalys could he do 7/1 at the ? I have a time on hold but I am super swamped I wont have time to call this patient, if you could help with that, I'd appreciate it greatly.     ----- Message -----  From: Michael Ho MD  Sent: 6/25/2022   8:34 PM CDT  To: Odalys Anthony    Might be able to. Jina-What do you think?   ----- Message -----  From: Odalys Santiago  Sent: 6/24/2022  10:05 AM CDT  To: Michael Ho MD    The soonest I was able to schedule his URS at [facility] was 7/14, due to Dr. Fuentes being in the cysto room on 6/30. Do you think you could get him in sooner at the ?    Odalys

## 2022-06-29 ENCOUNTER — PATIENT OUTREACH (OUTPATIENT)
Dept: UROLOGY | Facility: CLINIC | Age: 62
End: 2022-06-29

## 2022-07-01 ENCOUNTER — HOSPITAL ENCOUNTER (OUTPATIENT)
Facility: CLINIC | Age: 62
Discharge: HOME | End: 2022-07-01
Attending: UROLOGY | Admitting: UROLOGY
Payer: COMMERCIAL

## 2022-07-01 ENCOUNTER — ANESTHESIA (OUTPATIENT)
Dept: SURGERY | Facility: CLINIC | Age: 62
End: 2022-07-01
Payer: COMMERCIAL

## 2022-07-01 ENCOUNTER — ANESTHESIA EVENT (OUTPATIENT)
Dept: SURGERY | Facility: CLINIC | Age: 62
End: 2022-07-01
Payer: COMMERCIAL

## 2022-07-01 ENCOUNTER — APPOINTMENT (OUTPATIENT)
Dept: GENERAL RADIOLOGY | Facility: CLINIC | Age: 62
End: 2022-07-01
Attending: UROLOGY
Payer: COMMERCIAL

## 2022-07-01 VITALS
WEIGHT: 203.26 LBS | SYSTOLIC BLOOD PRESSURE: 150 MMHG | HEIGHT: 69 IN | HEART RATE: 65 BPM | DIASTOLIC BLOOD PRESSURE: 95 MMHG | TEMPERATURE: 97.5 F | OXYGEN SATURATION: 99 % | RESPIRATION RATE: 16 BRPM | BODY MASS INDEX: 30.11 KG/M2

## 2022-07-01 DIAGNOSIS — N32.89 BLADDER SPASMS: Primary | ICD-10-CM

## 2022-07-01 DIAGNOSIS — N20.0 RIGHT KIDNEY STONE: Primary | ICD-10-CM

## 2022-07-01 LAB — GLUCOSE BLDC GLUCOMTR-MCNC: 84 MG/DL (ref 70–99)

## 2022-07-01 PROCEDURE — 710N000010 HC RECOVERY PHASE 1, LEVEL 2, PER MIN: Performed by: UROLOGY

## 2022-07-01 PROCEDURE — 999N000141 HC STATISTIC PRE-PROCEDURE NURSING ASSESSMENT: Performed by: UROLOGY

## 2022-07-01 PROCEDURE — C2617 STENT, NON-COR, TEM W/O DEL: HCPCS | Performed by: UROLOGY

## 2022-07-01 PROCEDURE — C1769 GUIDE WIRE: HCPCS | Performed by: UROLOGY

## 2022-07-01 PROCEDURE — 255N000002 HC RX 255 OP 636: Performed by: UROLOGY

## 2022-07-01 PROCEDURE — 710N000012 HC RECOVERY PHASE 2, PER MINUTE: Performed by: UROLOGY

## 2022-07-01 PROCEDURE — 999N000179 XR SURGERY CARM FLUORO LESS THAN 5 MIN W STILLS: Mod: TC

## 2022-07-01 PROCEDURE — 250N000011 HC RX IP 250 OP 636

## 2022-07-01 PROCEDURE — 250N000009 HC RX 250: Performed by: UROLOGY

## 2022-07-01 PROCEDURE — 82365 CALCULUS SPECTROSCOPY: CPT | Performed by: UROLOGY

## 2022-07-01 PROCEDURE — 74420 UROGRAPHY RTRGR +-KUB: CPT | Mod: 26 | Performed by: UROLOGY

## 2022-07-01 PROCEDURE — 82962 GLUCOSE BLOOD TEST: CPT

## 2022-07-01 PROCEDURE — C1758 CATHETER, URETERAL: HCPCS | Performed by: UROLOGY

## 2022-07-01 PROCEDURE — 250N000025 HC SEVOFLURANE, PER MIN: Performed by: UROLOGY

## 2022-07-01 PROCEDURE — 360N000083 HC SURGERY LEVEL 3 W/ FLUORO, PER MIN: Performed by: UROLOGY

## 2022-07-01 PROCEDURE — 258N000003 HC RX IP 258 OP 636

## 2022-07-01 PROCEDURE — 258N000003 HC RX IP 258 OP 636: Performed by: ANESTHESIOLOGY

## 2022-07-01 PROCEDURE — 272N000001 HC OR GENERAL SUPPLY STERILE: Performed by: UROLOGY

## 2022-07-01 PROCEDURE — 52356 CYSTO/URETERO W/LITHOTRIPSY: CPT | Mod: RT | Performed by: UROLOGY

## 2022-07-01 PROCEDURE — 250N000009 HC RX 250

## 2022-07-01 PROCEDURE — 250N000013 HC RX MED GY IP 250 OP 250 PS 637: Performed by: ANESTHESIOLOGY

## 2022-07-01 PROCEDURE — 272N000002 HC OR SUPPLY OTHER OPNP: Performed by: UROLOGY

## 2022-07-01 PROCEDURE — 370N000017 HC ANESTHESIA TECHNICAL FEE, PER MIN: Performed by: UROLOGY

## 2022-07-01 DEVICE — URETERAL STENT
Type: IMPLANTABLE DEVICE | Site: URETER | Status: FUNCTIONAL
Brand: PERCUFLEX™ PLUS

## 2022-07-01 RX ORDER — FENTANYL CITRATE 50 UG/ML
INJECTION, SOLUTION INTRAMUSCULAR; INTRAVENOUS PRN
Status: DISCONTINUED | OUTPATIENT
Start: 2022-07-01 | End: 2022-07-01

## 2022-07-01 RX ORDER — KETOROLAC TROMETHAMINE 30 MG/ML
15 INJECTION, SOLUTION INTRAMUSCULAR; INTRAVENOUS EVERY 6 HOURS PRN
Status: DISCONTINUED | OUTPATIENT
Start: 2022-07-01 | End: 2022-07-01 | Stop reason: HOSPADM

## 2022-07-01 RX ORDER — FENTANYL CITRATE 50 UG/ML
25 INJECTION, SOLUTION INTRAMUSCULAR; INTRAVENOUS
Status: DISCONTINUED | OUTPATIENT
Start: 2022-07-01 | End: 2022-07-01 | Stop reason: HOSPADM

## 2022-07-01 RX ORDER — LIDOCAINE HYDROCHLORIDE 20 MG/ML
JELLY TOPICAL PRN
Status: DISCONTINUED | OUTPATIENT
Start: 2022-07-01 | End: 2022-07-01 | Stop reason: HOSPADM

## 2022-07-01 RX ORDER — OXYCODONE HYDROCHLORIDE 5 MG/1
5-10 TABLET ORAL EVERY 4 HOURS PRN
Qty: 6 TABLET | Refills: 0 | Status: SHIPPED | OUTPATIENT
Start: 2022-07-01

## 2022-07-01 RX ORDER — HYDROMORPHONE HYDROCHLORIDE 1 MG/ML
0.2 INJECTION, SOLUTION INTRAMUSCULAR; INTRAVENOUS; SUBCUTANEOUS EVERY 5 MIN PRN
Status: DISCONTINUED | OUTPATIENT
Start: 2022-07-01 | End: 2022-07-01 | Stop reason: HOSPADM

## 2022-07-01 RX ORDER — EPHEDRINE SULFATE 50 MG/ML
INJECTION, SOLUTION INTRAMUSCULAR; INTRAVENOUS; SUBCUTANEOUS PRN
Status: DISCONTINUED | OUTPATIENT
Start: 2022-07-01 | End: 2022-07-01

## 2022-07-01 RX ORDER — SODIUM CHLORIDE, SODIUM LACTATE, POTASSIUM CHLORIDE, CALCIUM CHLORIDE 600; 310; 30; 20 MG/100ML; MG/100ML; MG/100ML; MG/100ML
INJECTION, SOLUTION INTRAVENOUS CONTINUOUS
Status: DISCONTINUED | OUTPATIENT
Start: 2022-07-01 | End: 2022-07-01 | Stop reason: HOSPADM

## 2022-07-01 RX ORDER — LIDOCAINE 40 MG/G
CREAM TOPICAL
Status: DISCONTINUED | OUTPATIENT
Start: 2022-07-01 | End: 2022-07-01 | Stop reason: HOSPADM

## 2022-07-01 RX ORDER — AMOXICILLIN 250 MG
1-2 CAPSULE ORAL 2 TIMES DAILY
Qty: 30 TABLET | Refills: 0 | Status: SHIPPED | OUTPATIENT
Start: 2022-07-01

## 2022-07-01 RX ORDER — ONDANSETRON 2 MG/ML
4 INJECTION INTRAMUSCULAR; INTRAVENOUS EVERY 30 MIN PRN
Status: DISCONTINUED | OUTPATIENT
Start: 2022-07-01 | End: 2022-07-01 | Stop reason: HOSPADM

## 2022-07-01 RX ORDER — ONDANSETRON 4 MG/1
4 TABLET, ORALLY DISINTEGRATING ORAL EVERY 30 MIN PRN
Status: DISCONTINUED | OUTPATIENT
Start: 2022-07-01 | End: 2022-07-01 | Stop reason: HOSPADM

## 2022-07-01 RX ORDER — DEXAMETHASONE SODIUM PHOSPHATE 4 MG/ML
INJECTION, SOLUTION INTRA-ARTICULAR; INTRALESIONAL; INTRAMUSCULAR; INTRAVENOUS; SOFT TISSUE PRN
Status: DISCONTINUED | OUTPATIENT
Start: 2022-07-01 | End: 2022-07-01

## 2022-07-01 RX ORDER — LIDOCAINE HYDROCHLORIDE 20 MG/ML
INJECTION, SOLUTION INFILTRATION; PERINEURAL PRN
Status: DISCONTINUED | OUTPATIENT
Start: 2022-07-01 | End: 2022-07-01

## 2022-07-01 RX ORDER — CEFAZOLIN SODIUM 1 G/3ML
INJECTION, POWDER, FOR SOLUTION INTRAMUSCULAR; INTRAVENOUS PRN
Status: DISCONTINUED | OUTPATIENT
Start: 2022-07-01 | End: 2022-07-01

## 2022-07-01 RX ORDER — TAMSULOSIN HYDROCHLORIDE 0.4 MG/1
0.4 CAPSULE ORAL DAILY
Qty: 7 CAPSULE | Refills: 0 | Status: SHIPPED | OUTPATIENT
Start: 2022-07-01 | End: 2022-07-08

## 2022-07-01 RX ORDER — FENTANYL CITRATE 50 UG/ML
25 INJECTION, SOLUTION INTRAMUSCULAR; INTRAVENOUS EVERY 5 MIN PRN
Status: DISCONTINUED | OUTPATIENT
Start: 2022-07-01 | End: 2022-07-01 | Stop reason: HOSPADM

## 2022-07-01 RX ORDER — ONDANSETRON 2 MG/ML
INJECTION INTRAMUSCULAR; INTRAVENOUS PRN
Status: DISCONTINUED | OUTPATIENT
Start: 2022-07-01 | End: 2022-07-01

## 2022-07-01 RX ORDER — OXYBUTYNIN CHLORIDE 5 MG/1
5 TABLET ORAL 3 TIMES DAILY
Qty: 21 TABLET | Refills: 0 | Status: SHIPPED | OUTPATIENT
Start: 2022-07-01

## 2022-07-01 RX ORDER — ACETAMINOPHEN 325 MG/1
975 TABLET ORAL ONCE
Status: DISCONTINUED | OUTPATIENT
Start: 2022-07-01 | End: 2022-07-01 | Stop reason: HOSPADM

## 2022-07-01 RX ORDER — NITROFURANTOIN 25; 75 MG/1; MG/1
100 CAPSULE ORAL 2 TIMES DAILY
Qty: 6 CAPSULE | Refills: 0 | Status: ON HOLD | OUTPATIENT
Start: 2022-07-01 | End: 2022-07-04

## 2022-07-01 RX ORDER — OXYCODONE HYDROCHLORIDE 5 MG/1
5 TABLET ORAL EVERY 4 HOURS PRN
Status: DISCONTINUED | OUTPATIENT
Start: 2022-07-01 | End: 2022-07-01 | Stop reason: HOSPADM

## 2022-07-01 RX ORDER — ACETAMINOPHEN 325 MG/1
975 TABLET ORAL ONCE
Status: COMPLETED | OUTPATIENT
Start: 2022-07-01 | End: 2022-07-01

## 2022-07-01 RX ORDER — PROPOFOL 10 MG/ML
INJECTION, EMULSION INTRAVENOUS PRN
Status: DISCONTINUED | OUTPATIENT
Start: 2022-07-01 | End: 2022-07-01

## 2022-07-01 RX ADMIN — CEFAZOLIN 2 G: 1 INJECTION, POWDER, FOR SOLUTION INTRAMUSCULAR; INTRAVENOUS at 12:40

## 2022-07-01 RX ADMIN — HYDROMORPHONE HYDROCHLORIDE 0.5 MG: 1 INJECTION, SOLUTION INTRAMUSCULAR; INTRAVENOUS; SUBCUTANEOUS at 13:15

## 2022-07-01 RX ADMIN — LIDOCAINE HYDROCHLORIDE 100 MG: 20 INJECTION, SOLUTION INFILTRATION; PERINEURAL at 12:29

## 2022-07-01 RX ADMIN — DEXAMETHASONE SODIUM PHOSPHATE 4 MG: 4 INJECTION, SOLUTION INTRA-ARTICULAR; INTRALESIONAL; INTRAMUSCULAR; INTRAVENOUS; SOFT TISSUE at 12:35

## 2022-07-01 RX ADMIN — FENTANYL CITRATE 50 MCG: 50 INJECTION, SOLUTION INTRAMUSCULAR; INTRAVENOUS at 12:50

## 2022-07-01 RX ADMIN — Medication 5 MG: at 12:53

## 2022-07-01 RX ADMIN — PROPOFOL 200 MG: 10 INJECTION, EMULSION INTRAVENOUS at 12:29

## 2022-07-01 RX ADMIN — ONDANSETRON 4 MG: 2 INJECTION INTRAMUSCULAR; INTRAVENOUS at 14:07

## 2022-07-01 RX ADMIN — ACETAMINOPHEN 975 MG: 325 TABLET, FILM COATED ORAL at 12:12

## 2022-07-01 RX ADMIN — SODIUM CHLORIDE, POTASSIUM CHLORIDE, SODIUM LACTATE AND CALCIUM CHLORIDE: 600; 310; 30; 20 INJECTION, SOLUTION INTRAVENOUS at 12:25

## 2022-07-01 RX ADMIN — PROPOFOL 40 MG: 10 INJECTION, EMULSION INTRAVENOUS at 13:14

## 2022-07-01 RX ADMIN — Medication 10 MG: at 13:03

## 2022-07-01 RX ADMIN — PHENYLEPHRINE HYDROCHLORIDE 100 MCG: 10 INJECTION INTRAVENOUS at 12:59

## 2022-07-01 RX ADMIN — PROPOFOL 50 MG: 10 INJECTION, EMULSION INTRAVENOUS at 14:09

## 2022-07-01 RX ADMIN — MIDAZOLAM 2 MG: 1 INJECTION INTRAMUSCULAR; INTRAVENOUS at 12:18

## 2022-07-01 RX ADMIN — FENTANYL CITRATE 50 MCG: 50 INJECTION, SOLUTION INTRAMUSCULAR; INTRAVENOUS at 13:09

## 2022-07-01 ASSESSMENT — COPD QUESTIONNAIRES: COPD: 0

## 2022-07-01 ASSESSMENT — ENCOUNTER SYMPTOMS: SEIZURES: 0

## 2022-07-01 NOTE — OP NOTE
OPERATIVE REPORT    PREOPERATIVE DIAGNOSIS:  R-sided ureteral stone    POSTOPERATIVE DIAGNOSIS:  Same    PROCEDURES PERFORMED:   1. Cystourethroscopy  2. R Ureteroscopy  3. Retrograde pyelogram with interpretation of intraoperative fluoroscopic imaging  4. Holmium laser lithotripsy with basket stone extraction  5. R ureteral stent placement    STAFF SURGEON: Michael Ho MD  RESIDENT(S): Romeo Hyde MD  ANESTHESIA: General    ESTIMATED BLOOD LOSS: 1 cc  DRAINS/TUBES: 6 Thai x 26cm double-J ureteral stent, 16 Fr Hanson catheter     IV FLUIDS: Please see dictated anesthesia record  COMPLICATIONS: None.   SPECIMEN: Stones for analysis  SIGNIFICANT FINDINGS:   1. Patient is stone free in ureter  2. Proximal curl of the ureteral stent seen in the renal pelvis under fluoroscopy and distal curl seen in the bladder under direct vision.   3. Stones were buried in the ureteral wall causing significant ureteral wall edema but it appeared intact with no evidence of perforation at the end of the procedure     BRIEF OPERATIVE INDICATIONS: Ramesh Galvin is a(n) 61 year old male who presented with distal right ureteral stone.  After a discussion of all risks, benefits, and alternatives, the patient elected to proceed with definitive stone management. The patient understands the potential need for more than one procedure to eliminate all stone burden.     A 22-Thai rigid cystoscope was inserted into a well-lubricated urethra. The urethra was unremarkable without stricture. The previous indwelling ureteral stent was identified and removed with the flexible stent grasper to the level of the urethral meatus. A sensor wire was advanced up to the renal pelvis under fluoroscopic guidance and previous stent discarded.     A semi-rigid ureteroscope was introduced and advanced up the ureter until we encountered the stone(s) in the mid-distal ureter.  We attempted to move the stone from the ureter into the kidney, however the stones  were quite impacted and difficult to safely access given their location which was at the crossing of the ureter over the  A 200 micron laser fiber was used at a setting of 0.6 J and 6 Hz and lithotripsy was performed. Given the significant tortuosity of the ureter and the level of impaction of the stone, this proved quite difficult. Ultimately, we decided to place a second wire and switch to flexible ureteroscopy.    The flexible ureteroscope was used to re-identify the stone(s). A 200 micron laser fiber was used at a setting of 0.6 J and 6 Hz and lithotripsy was performed. A Halo basket was used to remove all fragments greater than 1 mm.  Pullback ureteroscopy was performed and showed no retained stone fragments or significant ureteral injury.    A 6Fr x 26-cm double-J stent was advanced over the Sensor wire, and a good proximal curl was seen in the renal pelvis on fluoroscopy and the distal curl was seen in the bladder. A 16Fr Hanson catheter was placed.     The patient tolerated the procedure well and there were no apparent complications. The patient  was transported to the postanesthesia care unit in stable condition.     POSTOP PLAN:  - Hanson removal in PACU phase 1, void prior to discharge  - Home with stent, will return to clinic in 1 week to have removed  - Can also discuss remaining nonobstructive right upper tract stone

## 2022-07-01 NOTE — ANESTHESIA PROCEDURE NOTES
Airway       Patient location during procedure: OR  Staff -        Other Anesthesia Staff: Kamryn Nicolas       Performed By: SRNA  Consent for Airway        Urgency: elective  Indications and Patient Condition       Indications for airway management: long-procedural       Induction type:intravenous       Mask difficulty assessment: 0 - not attempted    Final Airway Details       Final airway type: supraglottic airway    Supraglottic Airway Details        Type: LMA       Brand: I-Gel       LMA size: 5    Post intubation assessment        Placement verified by: capnometry, equal breath sounds and chest rise        Number of attempts at approach: 1       Secured with: silk tape       Ease of procedure: easy       Dentition: Intact and Unchanged

## 2022-07-01 NOTE — ANESTHESIA POSTPROCEDURE EVALUATION
Patient: Ramesh Galvin    Procedure: Procedure(s):  CYSTOURETEROSCOPY, WITH RETROGRADE PYELOGRAM, HOLMIUM LASER LITHOTRIPSY OF URETERAL CALCULUS, AND right stent exchange       Anesthesia Type:  General    Note:  Disposition: Outpatient   Postop Pain Control: Uneventful            Sign Out: Well controlled pain   PONV: No   Neuro/Psych: Uneventful            Sign Out: Acceptable/Baseline neuro status   Airway/Respiratory: Uneventful            Sign Out: Acceptable/Baseline resp. status   CV/Hemodynamics: Uneventful            Sign Out: Acceptable CV status; No obvious hypovolemia; No obvious fluid overload   Other NRE: NONE   DID A NON-ROUTINE EVENT OCCUR? No           Last vitals:  Vitals Value Taken Time   /88 07/01/22 1545   Temp 35.9  C (96.7  F) 07/01/22 1530   Pulse 66 07/01/22 1548   Resp 17 07/01/22 1530   SpO2 96 % 07/01/22 1548   Vitals shown include unvalidated device data.    Electronically Signed By: Israel Ochoa MD  July 1, 2022  3:50 PM

## 2022-07-01 NOTE — ANESTHESIA PREPROCEDURE EVALUATION
Anesthesia Pre-Procedure Evaluation    Patient: Ramesh Galvin   MRN: [id number] : 1960        Procedure : Procedure(s):  CYSTOURETEROSCOPY, WITH RETROGRADE PYELOGRAM, HOLMIUM LASER LITHOTRIPSY OF URETERAL CALCULUS, AND STENT INSERTION left          Past Medical History:   Diagnosis Date     AVM (arteriovenous malformation)      Hypercholesteremia      Hypothyroidism     blood work indicated this, no tumor     Psoriasis      Psoriatic arthritis (H)       Past Surgical History:   Procedure Laterality Date     COMBINED CYSTOSCOPY, INSERT STENT URETER(S) Right 2022    Procedure: 1. Cystourethroscopy 2. Right retrograde pyelography with interpretation of intraoperative fluoroscopic imaging 3. Right ureteral stent placement;  Surgeon: Michael Ho MD;  Location: [facility] OR      Allergies   Allergen Reactions     Codeine Sulfate       Social History     Tobacco Use     Smoking status: Former Smoker     Packs/day: 1.50     Years: 0.00     Pack years: 0.00     Types: Cigarettes     Quit date: 1981     Years since quittin.5     Smokeless tobacco: Never Used   Substance Use Topics     Alcohol use: Yes     Comment: 1-2 x/month      Wt Readings from Last 1 Encounters:   22 99.8 kg (220 lb)        Anesthesia Evaluation   Pt has had prior anesthetic. Type: General and MAC.    No history of anesthetic complications       ROS/MED HX  ENT/Pulmonary:    (-) asthma, COPD and sleep apnea   Neurologic: Comment: Bilateral leg numbness 2ndary to MS    (+) Multiple Sclerosis,  (-) no seizures, no CVA and no TIA   Cardiovascular:    (-) hypertension and dyslipidemia   METS/Exercise Tolerance: >4 METS    Hematologic:    (-) history of blood clots and anemia   Musculoskeletal:    (-) arthritis   GI/Hepatic:    (-) GERD and liver disease   Renal/Genitourinary:    (-) renal disease   Endo:     (+) Obesity,  (-) thyroid disease   Psychiatric/Substance Use:    (-) psychiatric history   Infectious Disease:  - neg  infectious disease ROS     Malignancy:       Other:      (+) , no H/O Chronic Pain,        Physical Exam    Airway        Mallampati: III   TM distance: > 3 FB   Neck ROM: full   Mouth opening: > 3 cm    Respiratory Devices and Support         Dental       (+) chipped      Cardiovascular   cardiovascular exam normal          Pulmonary   pulmonary exam normal                OUTSIDE LABS:  CBC:   Lab Results   Component Value Date    WBC 9.3 06/18/2022    WBC 11.5 (H) 06/18/2022    HGB 16.5 06/18/2022    HGB 16.4 04/03/2013    HCT 49.9 06/18/2022    HCT 50.3 04/03/2013     06/18/2022     04/03/2013     BMP:   Lab Results   Component Value Date     06/19/2022     06/18/2022    POTASSIUM 4.5 06/19/2022    POTASSIUM 4.4 06/18/2022    CHLORIDE 106 06/19/2022    CHLORIDE 105 06/18/2022    CO2 25 06/19/2022    CO2 26 06/18/2022    BUN 20 06/19/2022    BUN 25 06/18/2022    CR 1.24 06/19/2022    CR 1.60 (H) 06/18/2022     (H) 06/19/2022     (H) 06/18/2022     COAGS: No results found for: PTT, INR, FIBR  POC: No results found for: BGM, HCG, HCGS  HEPATIC:   Lab Results   Component Value Date    ALBUMIN 3.8 06/18/2022    PROTTOTAL 8.5 06/18/2022    ALT 24 06/18/2022    AST 24 06/18/2022    ALKPHOS 70 06/18/2022    BILITOTAL 0.7 06/18/2022     OTHER:   Lab Results   Component Value Date    LANCE 8.8 06/19/2022     This patient is a 61 year old male who presents with a few days history of right flank pain. This is his second time presenting to ED for flank pain. Repeat CT scan showed 2 obstructing right ureteral stones with moderate right hydronephrosis and significant per nephric stranding. His creatinine is elevated at 1.6 and urine analysis does not show any clear evidence of infection. WBC is slightly elevated at 11.5.       Anesthesia Plan    ASA Status:  2      Anesthesia Type: General.     - Airway: LMA   Induction: Intravenous.   Maintenance: Inhalation.   Techniques and Equipment:      - Lines/Monitors: 2nd IV     Consents    Anesthesia Plan(s) and associated risks, benefits, and realistic alternatives discussed. Questions answered and patient/representative(s) expressed understanding.     - Discussed: Risks, Benefits and Alternatives for BOTH SEDATION and the PROCEDURE were discussed     - Discussed with:  Patient      - Extended Intubation/Ventilatory Support Discussed: No.         Postoperative Care    Pain management: Multi-modal analgesia.   PONV prophylaxis: Ondansetron (or other 5HT-3), Dexamethasone or Solumedrol     Comments:                Crow Acosta MD

## 2022-07-01 NOTE — DISCHARGE INSTRUCTIONS
[facility]  Same-Day Surgery   Adult Discharge Orders & Instructions     For 24 hours after surgery    Get plenty of rest.  A responsible adult must stay with you for at least 24 hours after you leave the hospital.   Do not drive or use heavy equipment.  If you have weakness or tingling, don't drive or use heavy equipment until this feeling goes away.  Do not drink alcohol.  Avoid strenuous or risky activities.  Ask for help when climbing stairs.   You may feel lightheaded.  IF so, sit for a few minutes before standing.  Have someone help you get up.   If you have nausea (feel sick to your stomach): Drink only clear liquids such as apple juice, ginger ale, broth or 7-Up.  Rest may also help.  Be sure to drink enough fluids.  Move to a regular diet as you feel able.  You may have a slight fever. Call the doctor if your fever is over 100 F (37.7 C) (taken under the tongue) or lasts longer than 24 hours.  You may have a dry mouth, a sore throat, muscle aches or trouble sleeping.  These should go away after 24 hours.  Do not make important or legal decisions.   Call your doctor for any of the followin.  Signs of infection (fever, growing tenderness at the surgery site, a large amount of drainage or bleeding, severe pain, foul-smelling drainage, redness, swelling).    2. It has been over 8 to 10 hours since surgery and you are still not able to urinate (pass water).    3.  Headache for over 24 hours.    To contact a doctor, call Dr. Ho at [phone] or:    '   [phone] and ask for the resident on call for Urology (answered 24 hours a day)  '   Emergency Department:    [facility]: [phone]       (TTY for hearing impaired: [phone])    [facility]: [phone]       (TTY for hearing impaired: [phone])

## 2022-07-01 NOTE — OR NURSING
Spoke with Dr. Hyde to clarify catheter removal order.  Per Dr. Hyde, discontinue aceves catheter in PACU and have the patient void in Phase II prior to discharge home.  Urine color at aceves removal light pink-watermelon color.

## 2022-07-01 NOTE — ANESTHESIA CARE TRANSFER NOTE
Patient: Ramesh Galvin    Procedure: Procedure(s):  CYSTOURETEROSCOPY, WITH RETROGRADE PYELOGRAM, HOLMIUM LASER LITHOTRIPSY OF URETERAL CALCULUS, AND right stent exchange       Diagnosis: Ureteral stone [N20.1]  Diagnosis Additional Information: No value filed.    Anesthesia Type:   General     Note:    Oropharynx: oropharynx clear of all foreign objects and spontaneously breathing  Level of Consciousness: drowsy  Oxygen Supplementation: face mask  Level of Supplemental Oxygen (L/min / FiO2): 6 LPM  Independent Airway: airway patency satisfactory and stable  Dentition: dentition unchanged  Vital Signs Stable: post-procedure vital signs reviewed and stable  Report to RN Given: handoff report given  Patient transferred to: PACU    Handoff Report: Identifed the Patient, Identified the Reponsible Provider, Reviewed the pertinent medical history, Discussed the surgical course, Reviewed Intra-OP anesthesia mangement and issues during anesthesia, Set expectations for post-procedure period and Allowed opportunity for questions and acknowledgement of understanding      Vitals:  Vitals Value Taken Time   /83 07/01/22 1440   Temp     Pulse 63 07/01/22 1444   Resp 11 07/01/22 1444   SpO2 100 % 07/01/22 1444   Vitals shown include unvalidated device data.    Electronically Signed By: LYNN Klein CRNA  July 1, 2022  2:45 PM

## 2022-07-02 ENCOUNTER — HOSPITAL ENCOUNTER (INPATIENT)
Facility: CLINIC | Age: 62
LOS: 2 days | Discharge: HOME | DRG: 920 | End: 2022-07-04
Attending: HOSPITALIST | Admitting: HOSPITALIST
Payer: COMMERCIAL

## 2022-07-02 ENCOUNTER — APPOINTMENT (OUTPATIENT)
Dept: CT IMAGING | Facility: CLINIC | Age: 62
End: 2022-07-02
Attending: EMERGENCY MEDICINE
Payer: COMMERCIAL

## 2022-07-02 ENCOUNTER — HOSPITAL ENCOUNTER (EMERGENCY)
Facility: CLINIC | Age: 62
Discharge: ANOTHER HOSPITAL | End: 2022-07-02
Attending: EMERGENCY MEDICINE | Admitting: EMERGENCY MEDICINE
Payer: COMMERCIAL

## 2022-07-02 VITALS
TEMPERATURE: 97.7 F | OXYGEN SATURATION: 97 % | SYSTOLIC BLOOD PRESSURE: 128 MMHG | HEART RATE: 55 BPM | DIASTOLIC BLOOD PRESSURE: 85 MMHG | RESPIRATION RATE: 16 BRPM

## 2022-07-02 DIAGNOSIS — N17.9 AKI (ACUTE KIDNEY INJURY) (H): ICD-10-CM

## 2022-07-02 DIAGNOSIS — N32.89 BLADDER RUPTURE: ICD-10-CM

## 2022-07-02 DIAGNOSIS — N32.89 BLADDER RUPTURE: Primary | ICD-10-CM

## 2022-07-02 LAB
ABO/RH(D): NORMAL
ANION GAP SERPL CALCULATED.3IONS-SCNC: 8 MMOL/L (ref 3–14)
ANTIBODY SCREEN: NEGATIVE
BASOPHILS # BLD AUTO: 0 10E3/UL (ref 0–0.2)
BASOPHILS NFR BLD AUTO: 0 %
BUN SERPL-MCNC: 30 MG/DL (ref 7–30)
CALCIUM SERPL-MCNC: 9.2 MG/DL (ref 8.5–10.1)
CHLORIDE BLD-SCNC: 102 MMOL/L (ref 94–109)
CO2 SERPL-SCNC: 24 MMOL/L (ref 20–32)
CREAT SERPL-MCNC: 3.5 MG/DL (ref 0.66–1.25)
EOSINOPHIL # BLD AUTO: 0 10E3/UL (ref 0–0.7)
EOSINOPHIL NFR BLD AUTO: 0 %
ERYTHROCYTE [DISTWIDTH] IN BLOOD BY AUTOMATED COUNT: 13.7 % (ref 10–15)
GFR SERPL CREATININE-BSD FRML MDRD: 19 ML/MIN/1.73M2
GLUCOSE BLD-MCNC: 129 MG/DL (ref 70–99)
HCT VFR BLD AUTO: 49.7 % (ref 40–53)
HGB BLD-MCNC: 16.1 G/DL (ref 13.3–17.7)
HOLD SPECIMEN: NORMAL
IMM GRANULOCYTES # BLD: 0.1 10E3/UL
IMM GRANULOCYTES NFR BLD: 1 %
INR PPP: 1.13 (ref 0.85–1.15)
LYMPHOCYTES # BLD AUTO: 1 10E3/UL (ref 0.8–5.3)
LYMPHOCYTES NFR BLD AUTO: 5 %
MCH RBC QN AUTO: 32.3 PG (ref 26.5–33)
MCHC RBC AUTO-ENTMCNC: 32.4 G/DL (ref 31.5–36.5)
MCV RBC AUTO: 100 FL (ref 78–100)
MONOCYTES # BLD AUTO: 1.1 10E3/UL (ref 0–1.3)
MONOCYTES NFR BLD AUTO: 5 %
NEUTROPHILS # BLD AUTO: 18.8 10E3/UL (ref 1.6–8.3)
NEUTROPHILS NFR BLD AUTO: 89 %
NRBC # BLD AUTO: 0 10E3/UL
NRBC BLD AUTO-RTO: 0 /100
PLATELET # BLD AUTO: 203 10E3/UL (ref 150–450)
POTASSIUM BLD-SCNC: 5.2 MMOL/L (ref 3.4–5.3)
RADIOLOGIST FLAGS: ABNORMAL
RBC # BLD AUTO: 4.98 10E6/UL (ref 4.4–5.9)
SARS-COV-2 RNA RESP QL NAA+PROBE: NEGATIVE
SODIUM SERPL-SCNC: 134 MMOL/L (ref 133–144)
SPECIMEN EXPIRATION DATE: NORMAL
WBC # BLD AUTO: 21 10E3/UL (ref 4–11)

## 2022-07-02 PROCEDURE — 250N000011 HC RX IP 250 OP 636: Performed by: EMERGENCY MEDICINE

## 2022-07-02 PROCEDURE — 120N000001 HC R&B MED SURG/OB

## 2022-07-02 PROCEDURE — 72194 CT PELVIS W/O & W/DYE: CPT

## 2022-07-02 PROCEDURE — 99207 PR APP CREDIT; MD BILLING SHARED VISIT: CPT | Performed by: HOSPITALIST

## 2022-07-02 PROCEDURE — 51798 US URINE CAPACITY MEASURE: CPT

## 2022-07-02 PROCEDURE — 85025 COMPLETE CBC W/AUTO DIFF WBC: CPT | Performed by: EMERGENCY MEDICINE

## 2022-07-02 PROCEDURE — 99223 1ST HOSP IP/OBS HIGH 75: CPT | Performed by: STUDENT IN AN ORGANIZED HEALTH CARE EDUCATION/TRAINING PROGRAM

## 2022-07-02 PROCEDURE — 0T9B30Z DRAINAGE OF BLADDER WITH DRAINAGE DEVICE, PERCUTANEOUS APPROACH: ICD-10-PCS | Performed by: RADIOLOGY

## 2022-07-02 PROCEDURE — C9803 HOPD COVID-19 SPEC COLLECT: HCPCS

## 2022-07-02 PROCEDURE — 80048 BASIC METABOLIC PNL TOTAL CA: CPT | Performed by: EMERGENCY MEDICINE

## 2022-07-02 PROCEDURE — 99223 1ST HOSP IP/OBS HIGH 75: CPT | Mod: FS | Performed by: PHYSICIAN ASSISTANT

## 2022-07-02 PROCEDURE — 96365 THER/PROPH/DIAG IV INF INIT: CPT

## 2022-07-02 PROCEDURE — 85610 PROTHROMBIN TIME: CPT | Performed by: EMERGENCY MEDICINE

## 2022-07-02 PROCEDURE — 36415 COLL VENOUS BLD VENIPUNCTURE: CPT | Performed by: EMERGENCY MEDICINE

## 2022-07-02 PROCEDURE — 74176 CT ABD & PELVIS W/O CONTRAST: CPT

## 2022-07-02 PROCEDURE — 99285 EMERGENCY DEPT VISIT HI MDM: CPT | Mod: 25

## 2022-07-02 PROCEDURE — 258N000003 HC RX IP 258 OP 636: Performed by: EMERGENCY MEDICINE

## 2022-07-02 PROCEDURE — 258N000003 HC RX IP 258 OP 636: Performed by: PHYSICIAN ASSISTANT

## 2022-07-02 PROCEDURE — U0003 INFECTIOUS AGENT DETECTION BY NUCLEIC ACID (DNA OR RNA); SEVERE ACUTE RESPIRATORY SYNDROME CORONAVIRUS 2 (SARS-COV-2) (CORONAVIRUS DISEASE [COVID-19]), AMPLIFIED PROBE TECHNIQUE, MAKING USE OF HIGH THROUGHPUT TECHNOLOGIES AS DESCRIBED BY CMS-2020-01-R: HCPCS | Performed by: EMERGENCY MEDICINE

## 2022-07-02 PROCEDURE — 96376 TX/PRO/DX INJ SAME DRUG ADON: CPT | Mod: 59

## 2022-07-02 PROCEDURE — 86850 RBC ANTIBODY SCREEN: CPT | Performed by: EMERGENCY MEDICINE

## 2022-07-02 PROCEDURE — 96361 HYDRATE IV INFUSION ADD-ON: CPT

## 2022-07-02 PROCEDURE — 96375 TX/PRO/DX INJ NEW DRUG ADDON: CPT

## 2022-07-02 RX ORDER — NALOXONE HYDROCHLORIDE 0.4 MG/ML
0.2 INJECTION, SOLUTION INTRAMUSCULAR; INTRAVENOUS; SUBCUTANEOUS
Status: DISCONTINUED | OUTPATIENT
Start: 2022-07-02 | End: 2022-07-04 | Stop reason: HOSPADM

## 2022-07-02 RX ORDER — BISACODYL 10 MG
10 SUPPOSITORY, RECTAL RECTAL DAILY PRN
Status: DISCONTINUED | OUTPATIENT
Start: 2022-07-02 | End: 2022-07-04 | Stop reason: HOSPADM

## 2022-07-02 RX ORDER — PROCHLORPERAZINE 25 MG
25 SUPPOSITORY, RECTAL RECTAL EVERY 12 HOURS PRN
Status: DISCONTINUED | OUTPATIENT
Start: 2022-07-02 | End: 2022-07-04 | Stop reason: HOSPADM

## 2022-07-02 RX ORDER — ACETAMINOPHEN 650 MG/1
650 SUPPOSITORY RECTAL EVERY 6 HOURS PRN
Status: DISCONTINUED | OUTPATIENT
Start: 2022-07-02 | End: 2022-07-04 | Stop reason: HOSPADM

## 2022-07-02 RX ORDER — AMOXICILLIN 250 MG
2 CAPSULE ORAL 2 TIMES DAILY PRN
Status: DISCONTINUED | OUTPATIENT
Start: 2022-07-02 | End: 2022-07-04 | Stop reason: HOSPADM

## 2022-07-02 RX ORDER — FOLIC ACID 1 MG/1
1000 TABLET ORAL DAILY
Status: DISCONTINUED | OUTPATIENT
Start: 2022-07-03 | End: 2022-07-04 | Stop reason: HOSPADM

## 2022-07-02 RX ORDER — NALOXONE HYDROCHLORIDE 0.4 MG/ML
0.4 INJECTION, SOLUTION INTRAMUSCULAR; INTRAVENOUS; SUBCUTANEOUS
Status: DISCONTINUED | OUTPATIENT
Start: 2022-07-02 | End: 2022-07-04 | Stop reason: HOSPADM

## 2022-07-02 RX ORDER — LIDOCAINE 40 MG/G
CREAM TOPICAL
Status: DISCONTINUED | OUTPATIENT
Start: 2022-07-02 | End: 2022-07-04 | Stop reason: HOSPADM

## 2022-07-02 RX ORDER — SODIUM CHLORIDE 9 MG/ML
INJECTION, SOLUTION INTRAVENOUS CONTINUOUS
Status: DISCONTINUED | OUTPATIENT
Start: 2022-07-02 | End: 2022-07-04

## 2022-07-02 RX ORDER — ONDANSETRON 4 MG/1
4 TABLET, ORALLY DISINTEGRATING ORAL EVERY 6 HOURS PRN
Status: DISCONTINUED | OUTPATIENT
Start: 2022-07-02 | End: 2022-07-04 | Stop reason: HOSPADM

## 2022-07-02 RX ORDER — HYDROMORPHONE HYDROCHLORIDE 1 MG/ML
0.5 INJECTION, SOLUTION INTRAMUSCULAR; INTRAVENOUS; SUBCUTANEOUS ONCE
Status: COMPLETED | OUTPATIENT
Start: 2022-07-02 | End: 2022-07-02

## 2022-07-02 RX ORDER — IOPAMIDOL 755 MG/ML
25 INJECTION, SOLUTION INTRAVASCULAR ONCE
Status: COMPLETED | OUTPATIENT
Start: 2022-07-02 | End: 2022-07-02

## 2022-07-02 RX ORDER — ONDANSETRON 2 MG/ML
4 INJECTION INTRAMUSCULAR; INTRAVENOUS ONCE
Status: COMPLETED | OUTPATIENT
Start: 2022-07-02 | End: 2022-07-02

## 2022-07-02 RX ORDER — PROCHLORPERAZINE MALEATE 10 MG
10 TABLET ORAL EVERY 6 HOURS PRN
Status: DISCONTINUED | OUTPATIENT
Start: 2022-07-02 | End: 2022-07-04 | Stop reason: HOSPADM

## 2022-07-02 RX ORDER — CEFTRIAXONE 2 G/1
2 INJECTION, POWDER, FOR SOLUTION INTRAMUSCULAR; INTRAVENOUS ONCE
Status: COMPLETED | OUTPATIENT
Start: 2022-07-02 | End: 2022-07-02

## 2022-07-02 RX ORDER — IOPAMIDOL 755 MG/ML
500 INJECTION, SOLUTION INTRAVASCULAR ONCE
Status: COMPLETED | OUTPATIENT
Start: 2022-07-02 | End: 2022-07-02

## 2022-07-02 RX ORDER — POLYETHYLENE GLYCOL 3350 17 G/17G
17 POWDER, FOR SOLUTION ORAL DAILY PRN
Status: DISCONTINUED | OUTPATIENT
Start: 2022-07-02 | End: 2022-07-04 | Stop reason: HOSPADM

## 2022-07-02 RX ORDER — AMOXICILLIN 250 MG
1 CAPSULE ORAL 2 TIMES DAILY PRN
Status: DISCONTINUED | OUTPATIENT
Start: 2022-07-02 | End: 2022-07-04 | Stop reason: HOSPADM

## 2022-07-02 RX ORDER — ONDANSETRON 2 MG/ML
4 INJECTION INTRAMUSCULAR; INTRAVENOUS EVERY 6 HOURS PRN
Status: DISCONTINUED | OUTPATIENT
Start: 2022-07-02 | End: 2022-07-04 | Stop reason: HOSPADM

## 2022-07-02 RX ORDER — ACETAMINOPHEN 325 MG/1
650 TABLET ORAL EVERY 6 HOURS PRN
Status: DISCONTINUED | OUTPATIENT
Start: 2022-07-02 | End: 2022-07-04 | Stop reason: HOSPADM

## 2022-07-02 RX ORDER — HYDROMORPHONE HCL IN WATER/PF 6 MG/30 ML
0.2 PATIENT CONTROLLED ANALGESIA SYRINGE INTRAVENOUS
Status: DISCONTINUED | OUTPATIENT
Start: 2022-07-02 | End: 2022-07-04 | Stop reason: HOSPADM

## 2022-07-02 RX ORDER — CEFTRIAXONE 2 G/1
2 INJECTION, POWDER, FOR SOLUTION INTRAMUSCULAR; INTRAVENOUS EVERY 24 HOURS
Status: DISCONTINUED | OUTPATIENT
Start: 2022-07-03 | End: 2022-07-04 | Stop reason: HOSPADM

## 2022-07-02 RX ADMIN — IOPAMIDOL 25 ML: 755 INJECTION, SOLUTION INTRAVENOUS at 16:33

## 2022-07-02 RX ADMIN — HYDROMORPHONE HYDROCHLORIDE 0.5 MG: 1 INJECTION, SOLUTION INTRAMUSCULAR; INTRAVENOUS; SUBCUTANEOUS at 18:52

## 2022-07-02 RX ADMIN — CEFTRIAXONE SODIUM 2 G: 2 INJECTION, POWDER, FOR SOLUTION INTRAMUSCULAR; INTRAVENOUS at 15:25

## 2022-07-02 RX ADMIN — HYDROMORPHONE HYDROCHLORIDE 0.5 MG: 1 INJECTION, SOLUTION INTRAMUSCULAR; INTRAVENOUS; SUBCUTANEOUS at 15:25

## 2022-07-02 RX ADMIN — SODIUM CHLORIDE 1000 ML: 9 INJECTION, SOLUTION INTRAVENOUS at 13:45

## 2022-07-02 RX ADMIN — SODIUM CHLORIDE: 9 INJECTION, SOLUTION INTRAVENOUS at 22:19

## 2022-07-02 RX ADMIN — ONDANSETRON 4 MG: 2 INJECTION INTRAMUSCULAR; INTRAVENOUS at 15:26

## 2022-07-02 RX ADMIN — IOPAMIDOL 25 ML: 755 INJECTION, SOLUTION INTRAVENOUS at 16:20

## 2022-07-02 ASSESSMENT — ACTIVITIES OF DAILY LIVING (ADL)
ADLS_ACUITY_SCORE: 31
ADLS_ACUITY_SCORE: 35

## 2022-07-02 ASSESSMENT — ENCOUNTER SYMPTOMS
FLANK PAIN: 1
FEVER: 0

## 2022-07-02 NOTE — PROGRESS NOTES
Urology    CT images reviewed. It appears that he has a bladder perforation with extraperitoneal fluid anterior to bladder. Leave Hanson catheter in place. Transfer to [facility] for IR drain placement. Keep on IV abx. Will see patient and do formal consult after transfer    Juan José Fuentes MD   [facility]  [phone] clinic phone

## 2022-07-02 NOTE — H&P
[facility]    History and Physical - Hospitalist Service       Date of Admission:  7/2/2022  PRIMARY CARE PROVIDER:    Gee Javier    Assessment & Plan   Ramesh Galvin is a 61 year old male admitted on 7/2/2022.    Past medical history significant for Right ureteral stone with recent cystoscopy, lithotripsy and right ureteral stent placement (7/1), HLP, Hypothyroidism, MS, Psoriasis who was directly admitted to [facility] due to bladder perforation and ARF.      Patient presented to [facility] ED due to concerns of urinary retention.  Notably, patient underwent a cystoscopy with lithotripsy and right ureteral stent placement at [facility] on 7/1/2022.  Patient noted that he had had decreased urine output since returning home yesterday evening and has not really had the sensation to urinate since then.    Work-up in the ED included a basic metabolic panel that revealed a creatinine of 3.5 with a GFR of 19 and glucose of 129 otherwise within normal limits.  CBC with differential revealed a white count of 21, hemoglobin of 16.1, platelets of 230 and absolute neutrophils of 18.8 otherwise within normal limits.  INR was within normal limits at 1.13.  Blood type and screen was completed.  COVID-19 PCR was negative.  Abdomen/pelvis CT without contrast was suspicious for bladder perforation, possibly with both intra and extraperitoneal components.  A CT cystogram with and without contrast was completed and confirmed anterior bladder wall abruption.  Contrast appeared confined to the extraperitoneal space, there may be some peritoneal communication given previously described perihepatic and perisplenic fluid.  Urology was contacted and advised transfer to [facility] with plans for formal consultation and recommending continuation of IV antibiotics.  Patient received 1 L IV fluid bolus, 2 g of IV ceftriaxone, a total of 1 mg of IV Dilaudid, 4 mg of IV  "Zofran.    Bladder perforation  Right ureteral stone with recent cystoscopy, lithotripsy and right ureteral stent placement (7/1)  -  Urology consult requested.    - IV ceftriaxone continued.    - Hanson catheter to remain.    - PRN Analgesic available.    - Hold PTA Flomax 0.4 mg/d and Ditropan 5 mg TID    ARF  - IV Fluids at 100 ml/hr.   - BMP in the morning.      Psoriasis  - Hold PTA methotrexate 25 mg weekly on Mondays.    - Resumed on PTA folic acid 1 mg/d.    - Resumed on PTA Otezla 30 mg BID.      MS  Controlled with weekly methotrexate and will hold this medication and resume at discharge.      HLP  - Not currently on any medications.      Hypothyroidism  - Not currently on any medications.         Clinically Significant Risk Factors Present on Admission              # Acute Kidney Injury, unspecified: based on a >150% or 0.3 mg/dL increase in creatinine on admission compared to past 90 day average, will monitor renal function       # Obesity: Estimated body mass index is 30.46 kg/m  as calculated from the following:    Height as of 7/1/22: 1.74 m (5' 8.5\").    Weight as of 7/1/22: 92.2 kg (203 lb 4.2 oz).             Diet: NPO per Anesthesia Guidelines for Procedure/Surgery Except for: Meds  Combination Diet Regular Diet Adult  DVT Prophylaxis: Pneumatic Compression Devices  Hanson Catheter: PRESENT, indication:    Central Lines: None  Cardiac Monitoring: None  Code Status: Full Code; confirmed with the patient.           Disposition Plan   2-3 days while undergoing evaluation and management for bladder perforation and ARF.     The patient's care was discussed with the Patient.    The patient has been discussed with Dr. Hearn, who agrees with the assessment and plan at this time.    Edgardo Pitt PA-C  [facility]  Securely message with the Vocera Web Console (learn more here)  Text page via [employer] " [employer]    ______________________________________________________________________    Chief Complaint   Direct admit due to bladder perforation and ARF.    History is obtained from the patient and EMR.      History of Present Illness   Ramesh Galvin is a 61 year old male with a past medical history significant for Right ureteral stone with recent cystoscopy, lithotripsy and right ureteral stent placement (7/1), HLP, Hypothyroidism, Psoriasis, MS who was directly admitted to [facility] due to bladder perforation and ARF.      Patient presented to [facility] ED due to concerns of urinary retention.  Notably, patient underwent a cystoscopy with lithotripsy and right ureteral stent placement at [facility] on 7/1/2022.  Patient noted that he had had decreased urine output since returning home yesterday evening and has not really had the sensation to urinate since then.    Work-up in the ED included a basic metabolic panel that revealed a creatinine of 3.5 with a GFR of 19 and glucose of 129 otherwise within normal limits.  CBC with differential revealed a white count of 21, hemoglobin of 16.1, platelets of 230 and absolute neutrophils of 18.8 otherwise within normal limits.  INR was within normal limits at 1.13.  Blood type and screen was completed.  COVID-19 PCR was negative.  Abdomen/pelvis CT without contrast was suspicious for bladder perforation, possibly with both intra and extraperitoneal components.  A CT cystogram with and without contrast was completed and confirmed anterior bladder wall abruption.  Contrast appeared confined to the extraperitoneal space, there may be some peritoneal communication given previously described perihepatic and perisplenic fluid.  Urology was contacted and advised transfer to [facility] with plans for formal consultation and recommending continuation of IV antibiotics.  Patient received 1 L IV fluid bolus, 2 g of IV ceftriaxone, a total of 1 mg of IV  Dilaudid, 4 mg of IV Zofran.    Patient was seen in his hospital room where he was lying comfortably in bed upon arrival.  Initially, we reviewed his medical and surgical history as well as his home medications.  We discussed events that occurred over the last 24 hours with his recent cystoscopy with lithotripsy and right ureteral stent placement.    Upon questioning, patient indicated he does have some psoriatic plaques in multiple areas of his body.  He has had ongoing and intermittent abdominal pain which led to his diagnosis of a right ureteral stone.  Since his procedure yesterday patient has no urine output and noticed that his stomach was getting bloated and larger.  He stated he knows he has an enlarged prostate and he gets up frequently at night to urinate.  He did have some slight lightheadedness after the procedure yesterday.  He has chronic numbness and tingling sensation in his legs bilaterally which is present due to MS which is well controlled with weekly methotrexate.    Review of Systems    The 10 point Review of Systems is negative other than noted in the HPI.      Past Medical History    I have reviewed this patient's medical history and updated it with pertinent information if needed.   Past Medical History:   Diagnosis Date     AVM (arteriovenous malformation)      Hypercholesteremia      Hypothyroidism     blood work indicated this, no tumor     Psoriasis      Psoriatic arthritis (H)    Right ureteral stone with recent cystoscopy, lithotripsy and right ureteral stent placement (7/1), HLP, Hypothyroidism, Psoriasis, MS    Past Surgical History   I have reviewed this patient's surgical history and updated it with pertinent information if needed.  Past Surgical History:   Procedure Laterality Date     COMBINED CYSTOSCOPY, INSERT STENT URETER(S) Right 6/18/2022    Procedure: 1. Cystourethroscopy 2. Right retrograde pyelography with interpretation of intraoperative fluoroscopic imaging 3. Right  ureteral stent placement;  Surgeon: Michael Ho MD;  Location: [facility] OR       Social History   I have reviewed this patient's social history and updated it with pertinent information if needed.  Patient resides in a house in [place], [place] with his wife.  He is a former smoker who quit in .  He consumes alcohol maybe 1-2 times per month.  He does not use illicit drugs.    Social History     Tobacco Use     Smoking status: Former Smoker     Packs/day: 1.50     Years: 0.00     Pack years: 0.00     Types: Cigarettes     Quit date: 1981     Years since quittin.5     Smokeless tobacco: Never Used   Substance Use Topics     Alcohol use: Yes     Comment: 1-2 x/month     Drug use: No        Family History   I have reviewed this patient's family history and updated it with pertinent information if needed.   Family History   Problem Relation Age of Onset     Cancer Father         lymphatic     Cancer Maternal Grandfather         lung   Father: Lymphatic cancer  Mother: Breast cancer    Prior to Admission Medications   Prior to Admission Medications   Prescriptions Last Dose Informant Patient Reported? Taking?   ORDER FOR DME   No No   Sig: Equipment being ordered: Lift chair   apremilast (OTEZLA) 30 MG tablet   Yes No   Sig: Take 1 tablet by mouth 2 times daily   cyclobenzaprine (FLEXERIL) 10 MG tablet   Yes No   Sig: Take 10 mg by mouth 3 times daily as needed for muscle spasms   folic acid (FOLVITE) 1 MG tablet   Yes No   Sig: Take 1 tablet by mouth daily   methotrexate sodium 2.5 MG TABS   No No   Sig: Take 10 tablets (25 mg) by mouth once a week On    nitroFURantoin macrocrystal-monohydrate (MACROBID) 100 MG capsule   No No   Sig: Take 1 capsule (100 mg) by mouth 2 times daily for 3 days   oxyCODONE (ROXICODONE) 5 MG tablet   No No   Sig: Take 1-2 tablets (5-10 mg) by mouth every 4 hours as needed for moderate to severe pain   oxybutynin (DITROPAN) 5 MG tablet   No No   Sig: Take 1 tablet (5 mg) by mouth  3 times daily   phenazopyridine (AZO) 97.5 MG tablet   No No   Sig: Take 1 tablet (97.5 mg) by mouth 3 times daily for 3 days   predniSONE (DELTASONE) 10 MG tablet   Yes No   Sig: Take 10 mg by mouth daily Take 15 mg for 3-5 days, then taper by 5 mg every 3-5 days until off.   senna-docusate (SENOKOT-S/PERICOLACE) 8.6-50 MG tablet   No No   Sig: Take 1-2 tablets by mouth 2 times daily   sulfamethoxazole-trimethoprim (BACTRIM DS) 800-160 MG tablet   No No   Sig: Take 1 tablet by mouth 2 times daily   tamsulosin (FLOMAX) 0.4 MG capsule   No No   Sig: Take 1 capsule (0.4 mg) by mouth daily for 7 days      Facility-Administered Medications: None     Allergies   Allergies   Allergen Reactions     Codeine Sulfate        Physical Exam   BP (!) 143/85 (BP Location: Left arm)   Pulse 63   Temp 97.9  F (36.6  C) (Oral)   Resp 16   SpO2 96%     Constitutional: Awake, alert, cooperative, no apparent distress.   ENT: Normocephalic, without obvious abnormality, atraumatic, oral pharynx with moist mucus membranes, tonsils without erythema or exudates.  Eyes pupils are equal, round and reactive to light; extra occular movements intact.  Normal sclera.    Neck: Supple, symmetrical, trachea midline, no adenopathy.  Pulmonary: No increased work of breathing, good air exchange, clear to auscultation bilaterally, no crackles or wheezing.  Cardiovascular: Regular rate and rhythm, normal S1 and S2, no S3 or S4, and no murmur noted.  GI: Hypoactive bowel sounds, soft, non-distended, non-tender.    Skin/Integumen: Visualized skin appeared clear.  Neuro: CN II-XII grossly intact.  Upper and lower extremities strength, coordination and sensation intact bilaterally.    Psych:  Alert and oriented x 3. Normal affect.  Extremities: No lower extremity edema noted, and calves are non-tender to palpation bilaterally.   : Hanson catheter in place with urine in the bag.      Data   Data reviewed today: I reviewed all medications, new labs and  imaging results over the last 24 hours. I personally reviewed no images or EKG's today.    Recent Labs   Lab 07/02/22  1512 07/02/22  1342 07/01/22  1101   WBC  --  21.0*  --    HGB  --  16.1  --    MCV  --  100  --    PLT  --  203  --    INR 1.13  --   --    NA  --  134  --    POTASSIUM  --  5.2  --    CHLORIDE  --  102  --    CO2  --  24  --    BUN  --  30  --    CR  --  3.50*  --    ANIONGAP  --  8  --    LANCE  --  9.2  --    GLC  --  129* 84     Recent Results (from the past 24 hour(s))   Abd/pelvis CT no contrast - Stone Protocol   Result Value    Radiologist flags Perforated collecting system/bladder (AA)    Narrative    EXAM: CT ABDOMEN PELVIS W/O CONTRAST  LOCATION: [facility]  DATE/TIME: 7/2/2022 2:06 PM    INDICATION: eval postsurgical abd pain  COMPARISON: CT 06/08/2022, fluoroscopic images 07/01/2022  TECHNIQUE: CT scan of the abdomen and pelvis was performed without IV contrast. Multiplanar reformats were obtained. Dose reduction techniques were used.  CONTRAST: None.    FINDINGS:   LOWER CHEST: Unremarkable.    HEPATOBILIARY: Cholelithiasis within a decompressed gallbladder.    PANCREAS: Normal.    SPLEEN: Normal.    ADRENAL GLANDS: Normal.    KIDNEYS/BLADDER: There has been interval placement of right ureteral stent and removal of the ureteral calculus with decreased right hydronephrosis. There is new low density fluid and gas adjacent to the dome of the bladder, suggesting perforation at   this level, although no distinct defect is visualized on this noncontrast exam. There is also primarily retroperitoneal hypodense fluid, although there is also some fluid adjacent to the liver and spleen, compatible with intraperitoneal component.     BOWEL: No obstruction or inflammatory change.    LYMPH NODES: Normal.    VASCULATURE: Scattered calcified atherosclerosis.    PELVIC ORGANS: Mild prostatomegaly.    MUSCULOSKELETAL: No acute bony abnormality.      Impression    IMPRESSION:   1.   Findings suspicious for bladder perforation, possibly with both intra and extraperitoneal components. Recommend CT cystogram for further evaluation.      [Critical Result: Perforated collecting system/bladder]    Finding was identified on 7/2/2022 2:18 PM.     Dr. Banegas was contacted by me on 7/2/2022 2:24 PM and verbalized understanding of the critical result.        CT Cystogram wo & w Contrast    Narrative    EXAM: CT CYSTOGRAM WO and W CONTRAST  LOCATION: [facility]  DATE/TIME: 7/2/2022 4:19 PM    INDICATION: eval for bladder rupture  COMPARISON: None.  TECHNIQUE: CT pelvis without IV contrast using cystogram technique. Images without, and with filling of the bladder with [25] mL of dilute contrast. Post drain images were obtained. Multiplanar reformats were obtained. Dose reduction techniques were   used.    FINDINGS:   BLADDER: Interval placement of a Hanson catheter. There is a focal defect in the anterior bladder wall with leakage of contrast into the extraperitoneal space (series 8 image 70). No administered contrast appears to enter the intraperitoneal space on this   exam. Reflux contrast noted up the right ureter, without evidence of disruption.    ADDITIONAL FINDINGS: Primarily extraperitoneal fluid again noted, similar to same day CT. Partially visualized right ureteral stent. Intraperitoneal fluid better visualized in the abdomen. Bones are unchanged.      Impression    IMPRESSION:  1.  Anterior bladder wall rupture. While administered contrast appears confined to the extraperitoneal space, there may be some peritoneal communication given previously described perihepatic and perisplenic fluid.

## 2022-07-02 NOTE — ED NOTES
Report given to EMS. 1400 ml orange urine emptied from Hanson catheter. Pt given dose of dilaudid prior to transfer. Transferring in stable condition. Phone, wallet, keys on lanyard, shoes, and clothes in patient's possession.

## 2022-07-02 NOTE — ED TRIAGE NOTES
Patient presents to the ED reporting urinary retention. States had a renal stent placed yesterday and has been unable to void since last night.

## 2022-07-02 NOTE — ED PROVIDER NOTES
History     Chief Complaint:  Urinary Retention    The history is provided by the patient.      Ramesh Galvin is a 61 year old male with history of hyperlipidemia and hypothyroidism who presents with decreased urine output since last evening.  Patient reports has not really had the sensation to urinate since last evening.  Yesterday had cystoscopy with stent placement and lithotripsy.  Reports his pain is tolerable currently.  Does not think he has been drinking quite as much since last evening.  He is not eat anything since last evening.  Has not drank anything since 9 or 10 PM last night.  No urine output this morning.  Denies any fevers.  Denies vomiting or diarrhea..    ROS:  Review of Systems   Constitutional: Negative for fever.   Genitourinary: Positive for decreased urine volume and flank pain.   All other systems reviewed and are negative.    Allergies:  Codeine Sulfate     Medications:    Otezla  Flexeril  Macrobid  Ditropan  Roxicodone  Azo  Deltasone  Senna-docusate  Bactrim DS  Tamsulosin   Rheumatrex   Methotrexate     Past Medical History:    AVM  Hyperlipidemia  Hypothyroidism  Psoriasis  Chondromalacia of left knee  Kidney stone    Past Surgical History:    Cystoscopy   Insert stent ureters   Cystourethroscopy     Family History:    Cancer   Hypertension   Type 2 diabetes  CAD    Social History:  The patient presents in a private vehicle.  The patient presents alone.  PCP: [facility], [facility]     Physical Exam     Patient Vitals for the past 24 hrs:   BP Temp Pulse Resp SpO2   07/02/22 1430 (!) 161/95 -- -- -- --   07/02/22 1400 (!) 166/88 -- 69 -- 98 %   07/02/22 1345 (!) 161/88 -- 70 -- 99 %   07/02/22 1219 (!) 143/92 97.7  F (36.5  C) 74 16 98 %        Physical Exam  Gen: well appearing, in no acute distress  Oral : Mucous membranes moist,   Nose: No rhinorhea  Ears: External near normal, without drainage  Eyes: periorbital tissues and sclera normal   Neck: supple, no abnormal  swelling  Lungs: Clear bilaterally, no tachypnea or distress, speaks full sentences  CV: Regular rate, regular rhythm  Abd: Mild suprapubic tenderness  Ext: no lower extremity edema  Skin: warm, dry, well perfused, no rashes/bruising/lesions on exposed skin  Neuro: alert, no gross motor or sensory deficits,   Psych: pleasant mood, normal affect      Emergency Department Course     Imaging:  Abd/pelvis CT no contrast - Stone Protocol   Final Result   Abnormal   IMPRESSION:    1.  Findings suspicious for bladder perforation, possibly with both intra and extraperitoneal components. Recommend CT cystogram for further evaluation.         [Critical Result: Perforated collecting system/bladder]      Finding was identified on 7/2/2022 2:18 PM.       Dr. Banegas was contacted by me on 7/2/2022 2:24 PM and verbalized understanding of the critical result.             CT Cystogram wo & w Contrast    (Results Pending)      Report per radiology    Laboratory:  Labs Ordered and Resulted from Time of ED Arrival to Time of ED Departure   BASIC METABOLIC PANEL - Abnormal       Result Value    Sodium 134      Potassium 5.2      Chloride 102      Carbon Dioxide (CO2) 24      Anion Gap 8      Urea Nitrogen 30      Creatinine 3.50 (*)     Calcium 9.2      Glucose 129 (*)     GFR Estimate 19 (*)    CBC WITH PLATELETS AND DIFFERENTIAL - Abnormal    WBC Count 21.0 (*)     RBC Count 4.98      Hemoglobin 16.1      Hematocrit 49.7            MCH 32.3      MCHC 32.4      RDW 13.7      Platelet Count 203      % Neutrophils 89      % Lymphocytes 5      % Monocytes 5      % Eosinophils 0      % Basophils 0      % Immature Granulocytes 1      NRBCs per 100 WBC 0      Absolute Neutrophils 18.8 (*)     Absolute Lymphocytes 1.0      Absolute Monocytes 1.1      Absolute Eosinophils 0.0      Absolute Basophils 0.0      Absolute Immature Granulocytes 0.1      Absolute NRBCs 0.0     INR - Normal    INR 1.13     COVID-19 VIRUS (CORONAVIRUS) BY PCR  - Normal    SARS CoV2 PCR Negative     TYPE AND SCREEN, ADULT    ABO/RH(D) O NEG      Antibody Screen Negative      SPECIMEN EXPIRATION DATE [id number]     URINE CULTURE   ABO/RH TYPE AND SCREEN        Procedures       Emergency Department Course:    Reviewed:  I reviewed nursing notes, vitals, past medical history and Care Everywhere    Assessments:  1310 I obtained history and examined the patient as noted above.   1437 I rechecked the patient and explained findings.       Consults:   1424 I spoke with radiology.  1449 I spoke with Dr. Fuentes, urology.  1631 I spoke with Dr. Fuentes, urology  1647 I consulted with Dr. Hearn, hospitalist at [facility], regarding the patient's history and presentation here in the emergency department who accepted the patient for transfer and admission    Interventions:  Medications   iothalamate meglumine (CYSTO-CONRAY II) solution 25 mL (has no administration in time range)   0.9% sodium chloride BOLUS (0 mLs Intravenous Stopped 7/2/22 1445)   cefTRIAXone (ROCEPHIN) 2 g vial to attach to  ml bag for ADULTS or NS 50 ml bag for PEDS (0 g Intravenous Stopped 7/2/22 1555)   HYDROmorphone (PF) (DILAUDID) injection 0.5 mg (0.5 mg Intravenous Given 7/2/22 1525)   ondansetron (ZOFRAN) injection 4 mg (4 mg Intravenous Given 7/2/22 1526)   iopamidol (ISOVUE-370) solution 500 mL ( Intravenous Canceled Entry 7/2/22 1625)   iopamidol (ISOVUE-370) solution 25 mL (25 mLs Urethral Given 7/2/22 1633)        Disposition:  The patient was transferred to [facility] via EMS. Dr. Hearn accepted the patient for transfer.     Impression & Plan    CMS Diagnoses:   and None      Medical Decision Making:    Patient presents with increased pain and not urinating after a procedure yesterday.  Workup today in the ED shows likely ureteral or bladder disruption with leaking of urine into his perineal cavity. Spoke to the urologist on call, recommended hospital admission at [facility]. CT cystogram,  prophylactic antibiotics, and a urine culture, which I have started.  Contact with the hospitalist is in agreement.          Diagnosis:    ICD-10-CM    1. AVINASH (acute kidney injury) (H)  N17.9    2. Bladder rupture  N32.89         Discharge Medications:  New Prescriptions    No medications on file        7/2/2022   Jeremiah Banegas,*     Scribe Disclosure:  I, Randi Paolo, am serving as a scribe at 1:51 PM on 7/2/2022 to document services personally performed by Jeremiah Banegas MD based on my observations and the provider's statements to me.          Jeremiah Banegas MD  07/02/22 4200

## 2022-07-02 NOTE — PROGRESS NOTES
Pt called to report severe bladder spasm, abdominal pain, and stent pain. Still urinating though in smaller amounts, comes out in squirts, no clots but some stone debris. Discussed this could be related to stent pain, though an element of retention is also a possibility. He has not taken the flomax yet, asked to do so. Also recommended benadryl tonight. Will prescribe short course of oxybutynin to fill tomorrow if he continues to empty and suffers from bladder spasms. If he stops emptying and develops bilateral flank pain, present to ED for bladder scan and evaluation.    Carlos Valencia MD  Urology Resident

## 2022-07-03 ENCOUNTER — APPOINTMENT (OUTPATIENT)
Dept: CT IMAGING | Facility: CLINIC | Age: 62
DRG: 920 | End: 2022-07-03
Attending: STUDENT IN AN ORGANIZED HEALTH CARE EDUCATION/TRAINING PROGRAM
Payer: COMMERCIAL

## 2022-07-03 LAB
ANION GAP SERPL CALCULATED.3IONS-SCNC: 3 MMOL/L (ref 3–14)
BUN SERPL-MCNC: 19 MG/DL (ref 7–30)
CALCIUM SERPL-MCNC: 8.5 MG/DL (ref 8.5–10.1)
CHLORIDE BLD-SCNC: 109 MMOL/L (ref 94–109)
CO2 SERPL-SCNC: 26 MMOL/L (ref 20–32)
CREAT SERPL-MCNC: 1.6 MG/DL (ref 0.66–1.25)
ERYTHROCYTE [DISTWIDTH] IN BLOOD BY AUTOMATED COUNT: 13.8 % (ref 10–15)
GFR SERPL CREATININE-BSD FRML MDRD: 48 ML/MIN/1.73M2
GLUCOSE BLD-MCNC: 100 MG/DL (ref 70–99)
HCT VFR BLD AUTO: 41.8 % (ref 40–53)
HGB BLD-MCNC: 13.7 G/DL (ref 13.3–17.7)
MCH RBC QN AUTO: 32.2 PG (ref 26.5–33)
MCHC RBC AUTO-ENTMCNC: 32.8 G/DL (ref 31.5–36.5)
MCV RBC AUTO: 98 FL (ref 78–100)
PLATELET # BLD AUTO: 154 10E3/UL (ref 150–450)
POTASSIUM BLD-SCNC: 4.6 MMOL/L (ref 3.4–5.3)
RBC # BLD AUTO: 4.25 10E6/UL (ref 4.4–5.9)
SODIUM SERPL-SCNC: 138 MMOL/L (ref 133–144)
WBC # BLD AUTO: 12.4 10E3/UL (ref 4–11)

## 2022-07-03 PROCEDURE — 99232 SBSQ HOSP IP/OBS MODERATE 35: CPT | Performed by: INTERNAL MEDICINE

## 2022-07-03 PROCEDURE — 250N000013 HC RX MED GY IP 250 OP 250 PS 637: Performed by: PHYSICIAN ASSISTANT

## 2022-07-03 PROCEDURE — 49406 IMAGE CATH FLUID PERI/RETRO: CPT

## 2022-07-03 PROCEDURE — 250N000011 HC RX IP 250 OP 636: Performed by: PHYSICIAN ASSISTANT

## 2022-07-03 PROCEDURE — 85014 HEMATOCRIT: CPT | Performed by: PHYSICIAN ASSISTANT

## 2022-07-03 PROCEDURE — 36415 COLL VENOUS BLD VENIPUNCTURE: CPT | Performed by: PHYSICIAN ASSISTANT

## 2022-07-03 PROCEDURE — 82310 ASSAY OF CALCIUM: CPT | Performed by: PHYSICIAN ASSISTANT

## 2022-07-03 PROCEDURE — 250N000011 HC RX IP 250 OP 636: Performed by: RADIOLOGY

## 2022-07-03 PROCEDURE — 272N000431 CT ABDOMEN PERITONEUM ABSCESS DRAIN W CATH PLACE

## 2022-07-03 PROCEDURE — 93005 ELECTROCARDIOGRAM TRACING: CPT

## 2022-07-03 PROCEDURE — 120N000001 HC R&B MED SURG/OB

## 2022-07-03 PROCEDURE — 250N000009 HC RX 250: Performed by: HOSPITALIST

## 2022-07-03 PROCEDURE — 258N000003 HC RX IP 258 OP 636: Performed by: PHYSICIAN ASSISTANT

## 2022-07-03 RX ORDER — LIDOCAINE HYDROCHLORIDE 10 MG/ML
10 INJECTION, SOLUTION EPIDURAL; INFILTRATION; INTRACAUDAL; PERINEURAL ONCE
Status: COMPLETED | OUTPATIENT
Start: 2022-07-03 | End: 2022-07-03

## 2022-07-03 RX ORDER — FENTANYL CITRATE 50 UG/ML
25-50 INJECTION, SOLUTION INTRAMUSCULAR; INTRAVENOUS EVERY 5 MIN PRN
Status: DISCONTINUED | OUTPATIENT
Start: 2022-07-03 | End: 2022-07-03

## 2022-07-03 RX ORDER — FLUMAZENIL 0.1 MG/ML
0.2 INJECTION, SOLUTION INTRAVENOUS
Status: DISCONTINUED | OUTPATIENT
Start: 2022-07-03 | End: 2022-07-03

## 2022-07-03 RX ORDER — NALOXONE HYDROCHLORIDE 0.4 MG/ML
0.2 INJECTION, SOLUTION INTRAMUSCULAR; INTRAVENOUS; SUBCUTANEOUS
Status: DISCONTINUED | OUTPATIENT
Start: 2022-07-03 | End: 2022-07-03

## 2022-07-03 RX ORDER — NALOXONE HYDROCHLORIDE 0.4 MG/ML
0.4 INJECTION, SOLUTION INTRAMUSCULAR; INTRAVENOUS; SUBCUTANEOUS
Status: DISCONTINUED | OUTPATIENT
Start: 2022-07-03 | End: 2022-07-03

## 2022-07-03 RX ADMIN — FOLIC ACID 1000 MCG: 1 TABLET ORAL at 11:38

## 2022-07-03 RX ADMIN — OXYCODONE HYDROCHLORIDE 2.5 MG: 5 TABLET ORAL at 23:51

## 2022-07-03 RX ADMIN — HYDROMORPHONE HYDROCHLORIDE 0.2 MG: 0.2 INJECTION, SOLUTION INTRAMUSCULAR; INTRAVENOUS; SUBCUTANEOUS at 04:57

## 2022-07-03 RX ADMIN — FENTANYL CITRATE 50 MCG: 50 INJECTION INTRAMUSCULAR; INTRAVENOUS at 08:45

## 2022-07-03 RX ADMIN — Medication 1 MG: at 01:33

## 2022-07-03 RX ADMIN — SODIUM CHLORIDE: 9 INJECTION, SOLUTION INTRAVENOUS at 20:19

## 2022-07-03 RX ADMIN — MIDAZOLAM HYDROCHLORIDE 1 MG: 1 INJECTION, SOLUTION INTRAMUSCULAR; INTRAVENOUS at 08:44

## 2022-07-03 RX ADMIN — LIDOCAINE HYDROCHLORIDE 5 ML: 10 INJECTION, SOLUTION EPIDURAL; INFILTRATION; INTRACAUDAL; PERINEURAL at 09:20

## 2022-07-03 RX ADMIN — CEFTRIAXONE SODIUM 2 G: 2 INJECTION, POWDER, FOR SOLUTION INTRAMUSCULAR; INTRAVENOUS at 15:56

## 2022-07-03 ASSESSMENT — ACTIVITIES OF DAILY LIVING (ADL)
ADLS_ACUITY_SCORE: 18
WALKING_OR_CLIMBING_STAIRS_DIFFICULTY: NO
DRESSING/BATHING_DIFFICULTY: NO
HEARING_DIFFICULTY_OR_DEAF: NO
FALL_HISTORY_WITHIN_LAST_SIX_MONTHS: NO
ADLS_ACUITY_SCORE: 31
ADLS_ACUITY_SCORE: 18
ADLS_ACUITY_SCORE: 31
ADLS_ACUITY_SCORE: 18
DOING_ERRANDS_INDEPENDENTLY_DIFFICULTY: NO
TOILETING_ISSUES: NO
CONCENTRATING,_REMEMBERING_OR_MAKING_DECISIONS_DIFFICULTY: NO
ADLS_ACUITY_SCORE: 18
DIFFICULTY_EATING/SWALLOWING: NO
DIFFICULTY_COMMUNICATING: NO
ADLS_ACUITY_SCORE: 31
CHANGE_IN_FUNCTIONAL_STATUS_SINCE_ONSET_OF_CURRENT_ILLNESS/INJURY: NO
ADLS_ACUITY_SCORE: 31
WEAR_GLASSES_OR_BLIND: NO

## 2022-07-03 NOTE — CONSULTS
[facility] Urology Consultation    Ramesh Galvin MRN# [id number]   Age: 61 year old YOB: 1960     Date of Admission:  7/2/2022    Reason for consult: Bladder perforation       Requesting physician: Edgardo Pitt PA-C       Level of consult: One-time consult to assist in determining a diagnosis and to recommend an appropriate treatment plan           Assessment and Plan:   Assessment:   61-year-old man POD#1 status post cystoscopy, right ureteroscopy with laser lithotripsy and stone basketing, right ureteral stent placement, who is transferred from [facility] to [facility] after presenting with abdominal pain and urinary retention and found to have an anterior bladder rupture with fluid in the extraperitoneal space anterior to the bladder    On exam he has minimal abdominal tenderness. He does not have peritonitis. His aceves is in place draining well. He has leukocytosis to 21k. I discussed options including CT guided percutaneous drain placement vs. OR for exploratory laparotomy and cystorrhaphy. There is significant morbidity to the operative intervention and I believe that the extraperitoneal rupture will heal spontaneously with adequate drainage    I discussed this case with Dr. Ho who performed the ureteroscopy yesterday. He also reviewed the images and agrees with the plan for drain placement and prolonged catheterization    I discussed this with IR on call who will plan for drain placement in the AM    His acute kidney injury is almost certainly related to resorption of extravasated urine and I expect this to resolve with drainage.        Plan:   NPO after midnight  Continue IV abx, follow urine cultures  To IR for drain placement in AM  Will need prolonged Aceves and drain placement at least 2 weeks likely    Juan José Fuentes MD   [facility]  [phone] clinic phone               Chief Complaint:   Bladder rupture     History is obtained from the  patient    61-year-old man POD#1 status post cystoscopy, right ureteroscopy with laser lithotripsy and stone basketing, right ureteral stent placement, who is transferred from [facility] to [facility] after presenting with abdominal pain and urinary retention and found to have an anterior bladder rupture with fluid in the extraperitoneal space anterior to the bladder    Underwent URS with Dr. Ho yesterday, difficult ureteroscopy with impacted ureteral stone but no clinical concern for any intraoperative bladder perforation per Dr. Ho. After the ureteroscopy yesterday the patient had significant abdominal pain and inability to urinate in the evening.  He presented to [facility] emergency department.  Labs are notable for acute kidney injury and leukocytosis.  CT scan showed concern for urine leak.  A Hanson catheter was placed and a CT cystogram confirmed a leak from the anterior bladder.  He was then transferred to [facility].    He denies any fevers or chills.  His abdominal pain is relatively well controlled now.  Hanson catheter is in place draining well. No nausea or vomiting         Past Medical History:     I have reviewed this patient's past medical history  Past Medical History:   Diagnosis Date     AVM (arteriovenous malformation)      Hypercholesteremia      Hypothyroidism     blood work indicated this, no tumor     Psoriasis      Psoriatic arthritis (H)              Past Surgical History:     I have reviewed this patient's past surgical history  Past Surgical History:   Procedure Laterality Date     COMBINED CYSTOSCOPY, INSERT STENT URETER(S) Right 6/18/2022    Procedure: 1. Cystourethroscopy 2. Right retrograde pyelography with interpretation of intraoperative fluoroscopic imaging 3. Right ureteral stent placement;  Surgeon: Michael Ho MD;  Location:  OR             Social History:     I have reviewed this patient's social history  Social History     Tobacco  Use     Smoking status: Former Smoker     Packs/day: 1.50     Years: 0.00     Pack years: 0.00     Types: Cigarettes     Quit date: 1981     Years since quittin.5     Smokeless tobacco: Never Used   Substance Use Topics     Alcohol use: Yes     Comment: 1-2 x/month             Family History:     I have reviewed this patient's family history  Family History   Problem Relation Age of Onset     Cancer Father         lymphatic     Cancer Maternal Grandfather         lung     Family history not discussed          Immunizations:     Immunization History   Administered Date(s) Administered     COVID-19,PF,Moderna 2022             Allergies:     Allergies   Allergen Reactions     Codeine Sulfate              Medications:     Current Facility-Administered Medications   Medication     acetaminophen (TYLENOL) tablet 650 mg    Or     acetaminophen (TYLENOL) Suppository 650 mg     apremilast (OTEZLA) tablet 30 mg     bisacodyl (DULCOLAX) suppository 10 mg     cefTRIAXone (ROCEPHIN) 2 g vial to attach to  ml bag for ADULTS or NS 50 ml bag for PEDS     folic acid (FOLVITE) tablet 1,000 mcg     HYDROmorphone (DILAUDID) injection 0.2 mg     lidocaine (LMX4) cream     lidocaine 1 % 0.1-1 mL     melatonin tablet 1 mg     naloxone (NARCAN) injection 0.2 mg    Or     naloxone (NARCAN) injection 0.4 mg    Or     naloxone (NARCAN) injection 0.2 mg    Or     naloxone (NARCAN) injection 0.4 mg     ondansetron (ZOFRAN ODT) ODT tab 4 mg    Or     ondansetron (ZOFRAN) injection 4 mg     oxyCODONE IR (ROXICODONE) half-tab 2.5 mg     polyethylene glycol (MIRALAX) Packet 17 g     prochlorperazine (COMPAZINE) injection 10 mg    Or     prochlorperazine (COMPAZINE) tablet 10 mg    Or     prochlorperazine (COMPAZINE) suppository 25 mg     senna-docusate (SENOKOT-S/PERICOLACE) 8.6-50 MG per tablet 1 tablet    Or     senna-docusate (SENOKOT-S/PERICOLACE) 8.6-50 MG per tablet 2 tablet     sodium chloride (PF) 0.9% PF flush 3 mL      sodium chloride (PF) 0.9% PF flush 3 mL     sodium chloride 0.9% infusion             Review of Systems:   The Review of Systems is negative other than noted in the HPI          Physical Exam:   Vitals were reviewed  Temp: 97.9  F (36.6  C) Temp src: Oral BP: (!) 143/85 Pulse: 63   Resp: 16 SpO2: 96 %      Constitutional:   Awake, alert, nad   Eyes:   No scleral icterus   ENT:   Nc/at   Neck:   No neck mass   Hematologic / Lymphatic:   No bleed/bruise   Back:   Not examined   Lungs:   No increase wob   Cardiovascular:   extrem wwp   Abdomen:   Reducible umbilical hernia. Mild diffuse tenderness but not peritonitic. Protuberant, only mildly distended   Chest / Breast:   Not examined   Genitounirinary:   Hanson in place with clear orange urine (had taken pyridium earlier in the day)   Musculoskeletal:   no lower extremity pitting edema present   Neurologic:   Awake, alert, knight   Neuropsychiatric:   Normal mood and affect   Skin:   No rash             Data:   All laboratory and imaging data in the past 24 hours reviewed  Results for orders placed or performed during the hospital encounter of 07/02/22 (from the past 24 hour(s))   Basic metabolic panel (BMP)   Result Value Ref Range    Sodium 134 133 - 144 mmol/L    Potassium 5.2 3.4 - 5.3 mmol/L    Chloride 102 94 - 109 mmol/L    Carbon Dioxide (CO2) 24 20 - 32 mmol/L    Anion Gap 8 3 - 14 mmol/L    Urea Nitrogen 30 7 - 30 mg/dL    Creatinine 3.50 (H) 0.66 - 1.25 mg/dL    Calcium 9.2 8.5 - 10.1 mg/dL    Glucose 129 (H) 70 - 99 mg/dL    GFR Estimate 19 (L) >60 mL/min/1.73m2   [facility] Draw    Narrative    The following orders were created for panel order [facility].  Procedure                               Abnormality         Status                     ---------                               -----------         ------                     Extra Red Top Tube[[id number]]                               Final result               Extra Green Top (Lithium...[[id number]]                       Final result               Extra Purple Top Tube[[id number]]                            Final result                 Please view results for these tests on the individual orders.   Extra Red Top Tube   Result Value Ref Range    Hold Specimen JIC    Extra Green Top (Lithium Heparin) Tube   Result Value Ref Range    Hold Specimen JIC    Extra Purple Top Tube   Result Value Ref Range    Hold Specimen JIC    CBC + differential    Narrative    The following orders were created for panel order CBC + differential.  Procedure                               Abnormality         Status                     ---------                               -----------         ------                     CBC with platelets and d...[[id number]]  Abnormal            Final result                 Please view results for these tests on the individual orders.   CBC with platelets and differential   Result Value Ref Range    WBC Count 21.0 (H) 4.0 - 11.0 10e3/uL    RBC Count 4.98 4.40 - 5.90 10e6/uL    Hemoglobin 16.1 13.3 - 17.7 g/dL    Hematocrit 49.7 40.0 - 53.0 %     78 - 100 fL    MCH 32.3 26.5 - 33.0 pg    MCHC 32.4 31.5 - 36.5 g/dL    RDW 13.7 10.0 - 15.0 %    Platelet Count 203 150 - 450 10e3/uL    % Neutrophils 89 %    % Lymphocytes 5 %    % Monocytes 5 %    % Eosinophils 0 %    % Basophils 0 %    % Immature Granulocytes 1 %    NRBCs per 100 WBC 0 <1 /100    Absolute Neutrophils 18.8 (H) 1.6 - 8.3 10e3/uL    Absolute Lymphocytes 1.0 0.8 - 5.3 10e3/uL    Absolute Monocytes 1.1 0.0 - 1.3 10e3/uL    Absolute Eosinophils 0.0 0.0 - 0.7 10e3/uL    Absolute Basophils 0.0 0.0 - 0.2 10e3/uL    Absolute Immature Granulocytes 0.1 <=0.4 10e3/uL    Absolute NRBCs 0.0 10e3/uL   Abd/pelvis CT no contrast - Stone Protocol   Result Value Ref Range    Radiologist flags Perforated collecting system/bladder (AA)     Narrative    EXAM: CT ABDOMEN PELVIS W/O CONTRAST  LOCATION: [facility]  DATE/TIME: 7/2/2022 2:06  PM    INDICATION: eval postsurgical abd pain  COMPARISON: CT 06/08/2022, fluoroscopic images 07/01/2022  TECHNIQUE: CT scan of the abdomen and pelvis was performed without IV contrast. Multiplanar reformats were obtained. Dose reduction techniques were used.  CONTRAST: None.    FINDINGS:   LOWER CHEST: Unremarkable.    HEPATOBILIARY: Cholelithiasis within a decompressed gallbladder.    PANCREAS: Normal.    SPLEEN: Normal.    ADRENAL GLANDS: Normal.    KIDNEYS/BLADDER: There has been interval placement of right ureteral stent and removal of the ureteral calculus with decreased right hydronephrosis. There is new low density fluid and gas adjacent to the dome of the bladder, suggesting perforation at   this level, although no distinct defect is visualized on this noncontrast exam. There is also primarily retroperitoneal hypodense fluid, although there is also some fluid adjacent to the liver and spleen, compatible with intraperitoneal component.     BOWEL: No obstruction or inflammatory change.    LYMPH NODES: Normal.    VASCULATURE: Scattered calcified atherosclerosis.    PELVIC ORGANS: Mild prostatomegaly.    MUSCULOSKELETAL: No acute bony abnormality.      Impression    IMPRESSION:   1.  Findings suspicious for bladder perforation, possibly with both intra and extraperitoneal components. Recommend CT cystogram for further evaluation.      [Critical Result: Perforated collecting system/bladder]    Finding was identified on 7/2/2022 2:18 PM.     Dr. Banegas was contacted by me on 7/2/2022 2:24 PM and verbalized understanding of the critical result.        CBC + differential *Canceled*    Narrative    The following orders were created for panel order CBC + differential.  Procedure                               Abnormality         Status                     ---------                               -----------         ------                       Please view results for these tests on the individual orders.   CBC +  differential *Canceled*    Narrative    The following orders were created for panel order CBC + differential.  Procedure                               Abnormality         Status                     ---------                               -----------         ------                     CBC with platelets and d...[[id number]]                                                   Please view results for these tests on the individual orders.   INR   Result Value Ref Range    INR 1.13 0.85 - 1.15   ABO/Rh type and screen    Narrative    The following orders were created for panel order ABO/Rh type and screen.  Procedure                               Abnormality         Status                     ---------                               -----------         ------                     Adult Type and Screen[[id number]]                            Final result                 Please view results for these tests on the individual orders.   Extra Tube ([place] Draw)    Narrative    The following orders were created for panel order Extra Tube ([place] Draw).  Procedure                               Abnormality         Status                     ---------                               -----------         ------                     Extra Blood Bank Purple ...[[id number]]                      Final result               Extra Blood Bank Purple ...[[id number]]                      Final result                 Please view results for these tests on the individual orders.   Extra Blood Bank Purple Top Tube   Result Value Ref Range    Hold Specimen [facility]    Extra Blood Bank Purple Top Tube   Result Value Ref Range    Hold Specimen [facility]    Adult Type and Screen   Result Value Ref Range    ABO/RH(D) O NEG     Antibody Screen Negative Negative    SPECIMEN EXPIRATION DATE [id number]    Asymptomatic COVID-19 Virus (Coronavirus) by PCR Nasopharyngeal    Specimen: Nasopharyngeal; Swab   Result Value Ref Range    SARS CoV2 PCR Negative Negative     Narrative    Testing was performed using the Xpert Xpress SARS-CoV-2 Assay on the   Cepheid Gene-Xpert Instrument Systems. Additional information about   this Emergency Use Authorization (EUA) assay can be found via the Lab   Guide. This test should be ordered for the detection of SARS-CoV-2 in   individuals who meet SARS-CoV-2 clinical and/or epidemiological   criteria. Test performance is unknown in asymptomatic patients. This   test is for in vitro diagnostic use under the FDA EUA for   laboratories certified under CLIA to perform high complexity testing.   This test has not been FDA cleared or approved. A negative result   does not rule out the presence of PCR inhibitors in the specimen or   target RNA in concentration below the limit of detection for the   assay. The possibility of a false negative should be considered if   the patient's recent exposure or clinical presentation suggests   COVID-19. This test was validated by the [facility]. This laboratory is certified under the Clinical Laboratory Improvement Amendments of 1988 (CLIA-88) as qualified to perform high complexity laboratory testing.     CT Cystogram wo & w Contrast    Narrative    EXAM: CT CYSTOGRAM WO and W CONTRAST  LOCATION: [facility]  DATE/TIME: 7/2/2022 4:19 PM    INDICATION: eval for bladder rupture  COMPARISON: None.  TECHNIQUE: CT pelvis without IV contrast using cystogram technique. Images without, and with filling of the bladder with [25] mL of dilute contrast. Post drain images were obtained. Multiplanar reformats were obtained. Dose reduction techniques were   used.    FINDINGS:   BLADDER: Interval placement of a Hanson catheter. There is a focal defect in the anterior bladder wall with leakage of contrast into the extraperitoneal space (series 8 image 70). No administered contrast appears to enter the intraperitoneal space on this   exam. Reflux contrast noted up the right  ureter, without evidence of disruption.    ADDITIONAL FINDINGS: Primarily extraperitoneal fluid again noted, similar to same day CT. Partially visualized right ureteral stent. Intraperitoneal fluid better visualized in the abdomen. Bones are unchanged.      Impression    IMPRESSION:  1.  Anterior bladder wall rupture. While administered contrast appears confined to the extraperitoneal space, there may be some peritoneal communication given previously described perihepatic and perisplenic fluid.     All imaging studies reviewed by me.        He has an anterior bladder rupture with a large fluid collection anterior to the bladder.          Attestation:  I have reviewed today's vital signs, notes, medications, labs and imaging.  Amount of time performed on this consult: 80 minutes.    Juan José Fuentes MD

## 2022-07-03 NOTE — PLAN OF CARE
Goal Outcome Evaluation:    Pt Aox4. VSS on RA. Q4hr vitals. PIV running NS at 100ml/hr. Up independently. Foly patent with good output. Passing gas. Melatonin given to promote sleep. Pain managed with Dilaudid. NPO at midinight, plan to drain abscess today.     Plan of Care Reviewed With: patient     Overall Patient Progress: no change

## 2022-07-03 NOTE — PLAN OF CARE
INTERVENTIONAL RADIOLOGY    Spoke with Dr. Fuentes (urology).  The patient has a low anterior bladder perforation with a Hanson catheter in place.  Also has right ureteral stent.  Plan will be for CT guided drainage in morning.  Although there is not a well defined fluid collection the plan will be to attempt catheter placement into the anterior space near the perforation in an attempt to divert urine to close the perforation.    Please keep NPO after midnight and hold anticoagulation.    Preston Garcia MD  Vascular and Interventional Radiology

## 2022-07-03 NOTE — PROGRESS NOTES
[facility]    Medicine Progress Note - Hospitalist Service    Date of Admission:  7/2/2022    Assessment & Plan            Ramesh Galvin is a 61 year old male admitted on 7/2/2022.     Past medical history significant for Right ureteral stone with recent cystoscopy, lithotripsy and right ureteral stent placement (7/1), HLP, Hypothyroidism, MS, Psoriasis who was directly admitted to [facility] due to bladder perforation and ARF.       Patient presented to [facility] ED due to concerns of urinary retention.  Notably, patient underwent a cystoscopy with lithotripsy and right ureteral stent placement at [facility] on 7/1/2022.  Patient noted that he had had decreased urine output since returning home  and has not really had the sensation to urinate since then.     Work-up in the ED included a basic metabolic panel that revealed a creatinine of 3.5 , WBC 21K. Abdomen/pelvis CT without contrast was suspicious for bladder perforation, possibly with both intra and extraperitoneal components.  A CT cystogram with and without contrast was completed and confirmed anterior bladder wall rupture.  Contrast appeared confined to the extraperitoneal space, there may be some peritoneal communication given previously described perihepatic and perisplenic fluid.  Urology was contacted and advised transfer to [facility]  For further management     Bladder perforation  Right ureteral stone with recent cystoscopy, lithotripsy and right ureteral stent placement (7/1)  - s/p  CT guided percutaneous drain placement by IR on 7/3  - per urology, will need prolonged aceves and drain placmement for at least 2 weeks likely  - continue ceftriaxone   - PRN Analgesic available.    - Hold PTA Flomax 0.4 mg/d and Ditropan 5 mg TID  - appreciate urology assistance, management per urology  - wbc down from 21>12.4  - f/u labs in AM    Acute kidney injury  - likey secondary to above, improving  - Cr  "3.5-->1.6  - continue IVF, f/u BMP in AM  - continue aceves as above     Psoriasis  - Hold PTA methotrexate 25 mg weekly on Mondays.    - Resumed on PTA folic acid 1 mg/d.    - Resumed on PTA Otezla 30 mg BID.       MS  Controlled with weekly methotrexate and will hold this medication and resume at discharge.       HLP  - Not currently on any medications.       Hypothyroidism  - Not currently on any medications.         Diet: Regular Diet Adult    DVT Prophylaxis: Ambulate every shift  Aceves Catheter: Not present  Central Lines: None  Cardiac Monitoring: None  Code Status: Full Code      Disposition Plan      Expected Discharge Date: 07/05/2022                The patient's care was discussed with the Bedside Nurse and Patient.    Jennyfer Noble MD  Hospitalist Service  [facility]  Securely message with the Vocera Web Console (learn more here)  Text page via [employer] Paging/Directory         Clinically Significant Risk Factors Present on Admission                    # Obesity: Estimated body mass index is 31.59 kg/m  as calculated from the following:    Height as of 7/1/22: 1.74 m (5' 8.5\").    Weight as of this encounter: 95.6 kg (210 lb 12.8 oz).        ______________________________________________________________________    Interval History   S/p drain placement this morning by BRANDI Kinney n/v. He is afebrile.       Data reviewed today: I reviewed all medications, new labs and imaging results over the last 24 hours. I personally reviewed no images or EKG's today.    Physical Exam   Vital Signs: Temp: 97.7  F (36.5  C) Temp src: Oral BP: 132/79 Pulse: (!) 47   Resp: 16 SpO2: 99 % O2 Device: Nasal cannula Oxygen Delivery: 2 LPM  Weight: 210 lbs 12.8 oz  General Appearance: Alert, awake and no apparent distress  Respiratory: clear to auscultation bilaterally, no wheezing  Cardiovascular: regular rate and rhythm  GI: soft, drain present left abdomen, some tenderness in the lower " abdomen  Skin: warm and dry      Data   Recent Labs   Lab 07/03/22  0605 07/02/22  1512 07/02/22  1342 07/01/22  1101   WBC 12.4*  --  21.0*  --    HGB 13.7  --  16.1  --    MCV 98  --  100  --      --  203  --    INR  --  1.13  --   --      --  134  --    POTASSIUM 4.6  --  5.2  --    CHLORIDE 109  --  102  --    CO2 26  --  24  --    BUN 19  --  30  --    CR 1.60*  --  3.50*  --    ANIONGAP 3  --  8  --    LANCE 8.5  --  9.2  --    *  --  129* 84     Recent Results (from the past 24 hour(s))   Abd/pelvis CT no contrast - Stone Protocol   Result Value    Radiologist flags Perforated collecting system/bladder (AA)    Narrative    EXAM: CT ABDOMEN PELVIS W/O CONTRAST  LOCATION: [facility]  DATE/TIME: 7/2/2022 2:06 PM    INDICATION: eval postsurgical abd pain  COMPARISON: CT 06/08/2022, fluoroscopic images 07/01/2022  TECHNIQUE: CT scan of the abdomen and pelvis was performed without IV contrast. Multiplanar reformats were obtained. Dose reduction techniques were used.  CONTRAST: None.    FINDINGS:   LOWER CHEST: Unremarkable.    HEPATOBILIARY: Cholelithiasis within a decompressed gallbladder.    PANCREAS: Normal.    SPLEEN: Normal.    ADRENAL GLANDS: Normal.    KIDNEYS/BLADDER: There has been interval placement of right ureteral stent and removal of the ureteral calculus with decreased right hydronephrosis. There is new low density fluid and gas adjacent to the dome of the bladder, suggesting perforation at   this level, although no distinct defect is visualized on this noncontrast exam. There is also primarily retroperitoneal hypodense fluid, although there is also some fluid adjacent to the liver and spleen, compatible with intraperitoneal component.     BOWEL: No obstruction or inflammatory change.    LYMPH NODES: Normal.    VASCULATURE: Scattered calcified atherosclerosis.    PELVIC ORGANS: Mild prostatomegaly.    MUSCULOSKELETAL: No acute bony abnormality.      Impression     IMPRESSION:   1.  Findings suspicious for bladder perforation, possibly with both intra and extraperitoneal components. Recommend CT cystogram for further evaluation.      [Critical Result: Perforated collecting system/bladder]    Finding was identified on 7/2/2022 2:18 PM.     Dr. Banegas was contacted by me on 7/2/2022 2:24 PM and verbalized understanding of the critical result.        CT Cystogram wo & w Contrast    Narrative    EXAM: CT CYSTOGRAM WO and W CONTRAST  LOCATION: [facility]  DATE/TIME: 7/2/2022 4:19 PM    INDICATION: eval for bladder rupture  COMPARISON: None.  TECHNIQUE: CT pelvis without IV contrast using cystogram technique. Images without, and with filling of the bladder with [25] mL of dilute contrast. Post drain images were obtained. Multiplanar reformats were obtained. Dose reduction techniques were   used.    FINDINGS:   BLADDER: Interval placement of a Hanson catheter. There is a focal defect in the anterior bladder wall with leakage of contrast into the extraperitoneal space (series 8 image 70). No administered contrast appears to enter the intraperitoneal space on this   exam. Reflux contrast noted up the right ureter, without evidence of disruption.    ADDITIONAL FINDINGS: Primarily extraperitoneal fluid again noted, similar to same day CT. Partially visualized right ureteral stent. Intraperitoneal fluid better visualized in the abdomen. Bones are unchanged.      Impression    IMPRESSION:  1.  Anterior bladder wall rupture. While administered contrast appears confined to the extraperitoneal space, there may be some peritoneal communication given previously described perihepatic and perisplenic fluid.   CT Abdomen Peritoneum Abscess Drainage    Narrative    [facility]  LOCATION: [facility]  DATE: 7/3/2022    PROCEDURE: CT GUIDED ANTERIOR PELVIC DRAINAGE CATHETER PLACEMENT    INTERVENTIONAL RADIOLOGIST: Preston Garcia MD.    INDICATION: 61-year-old  male with an extraperitoneal bladder rupture.  Placement of an anterior low pelvic drainage catheter adjacent to the  perforation is requested for urinary diversion.    CONSENT: The risks, benefits and alternatives of imaging guided  drainage catheter placement were discussed with the patient  in  detail. All questions were answered. Informed consent was given to  proceed with the procedure.    MODERATE SEDATION: Versed 1 mg IV; Fentanyl 50 mcg IV.  Under  physician supervision, Versed and fentanyl were administered for  moderate sedation. Pulse oximetry, heart rate and blood pressure were  continuously monitored by an independent trained observer. The  physician spent 30 minutes of face-to-face sedation time with the  patient.    CONTRAST: None.  ANTIBIOTICS: None.  ADDITIONAL MEDICATIONS: None.    CT FLUOROSCOPIC TIME: 3 seconds.  RADIATION DOSE: Dose Length Product: 300 mGy-cm.  Dose reduction  techniques were utilized.    COMPLICATIONS: No immediate complications.    STERILE BARRIER TECHNIQUE: Maximum sterile barrier technique was used.  Cutaneous antisepsis was performed at the operative site with  application of 2% chlorhexidine and large sterile drape. Prior to the  procedure, the  and assistant performed hand hygiene and wore  hat, mask, sterile gown, and sterile gloves during the entire  procedure.    PROCEDURE:    A limited nonenhanced CT study was performed for localization  purposes. Based on the results of this study an anterior left  paramedian approach was chosen.     Using intermittent CT guidance, a needle was inserted anterior to the  low left paramedian bladder. A wire was advanced and coiled in the  fluid collection. Following tract dilation, a 10 [place] drainage  catheter was advanced and secured using sutures. The catheter was  attached to bulb drainage.    FINDINGS:  The initial CT images demonstrates interval resolution of the fluid  anterior to the bladder compared with the study  from yesterday. A  small amount of extraluminal gas is present within the anterior  pelvis. A Hanson catheter is in place.    The completion CT images shows the catheter within the anterior low  pelvic peritoneal fat adjacent to the gallbladder. No evidence of  significant hemorrhage.      Impression    IMPRESSION:    Successful CT-guided pelvic drainage, as discussed above.    BIN SUNSHINE MD         SYSTEM ID:  [id number]     Medications     sodium chloride 100 mL/hr at 07/02/22 2211       cefTRIAXone  2 g Intravenous Q24H     folic acid  1,000 mcg Oral Daily     sodium chloride (PF)  3 mL Intracatheter Q8H

## 2022-07-03 NOTE — PLAN OF CARE
Goal Outcome Evaluation:                    Patient A&0x4. Up w SBA. Regular diet.  He went down to IR for drain placement this shift. He c/o mild abdominal pain with palpation. Denies nausea. Hanson in place with good out put. Scant drainage in abdominal MI drain. Lungs clear. No edema. HR Ej 41-50's when laying down. MD aware. HR 60-70 when standing. Full code. Discharge  to be determined per urology.

## 2022-07-03 NOTE — PROGRESS NOTES
Urology    Attempted to see in AM but was already down getting drain placed    See consult note from yesterday for plan    Juan José Fuentes MD   [facility]  [phone] clinic phone

## 2022-07-03 NOTE — PLAN OF CARE
Goal Outcome Evaluation:  A&O, VSS on RA, pain 3/10 on bilateral lower abdomen, aggravated with ambulation, Hanson catheter in use with dark output,  NPO at midnight, plan to drain abscess by tomorrow. Continue to monitor.

## 2022-07-03 NOTE — PROGRESS NOTES
Paged Urology group requesting for a call back to clarify if the order  CT abdomen abscess drainage order needs to be done today.

## 2022-07-03 NOTE — PROGRESS NOTES
Urology    Patient seen and examined, Formal consult note to follow    61-year-old man postop day 1 status post cystoscopy, right ureteroscopy with laser lithotripsy and stone basketing, right ureteral stent placement, who is transferred from [facility] to [facility] after presenting with abdominal pain and urinary retention and found to have an anterior bladder rupture with fluid in the extraperitoneal space anterior to the bladder    On exam he has minimal abdominal tenderness. He does not have peritonitis. His aceves is in place draining well. He has leukocytosis to 21k. I discussed options including CT guided percutaneous drain placement vs. OR for exploratory laparotomy and cystorrhaphy. There is significant morbidity to the operative intervention and I believe that the extraperitoneal rupture will heal spontaneously with adequate drainage    I discussed this with IR on call who will plan for drain placement in the AM    NPO after midnight  Continue IV abx, follow urine cultures  To IR for drain placement in AM    Juan José Fuentes MD   [facility]  [phone] clinic phone

## 2022-07-03 NOTE — PHARMACY-ADMISSION MEDICATION HISTORY
Pharmacy Medication History  Admission medication history interview status for the 7/2/2022  admission is complete. See EPIC admission navigator for prior to admission medications     Location of Interview: Patient room  Medication history sources: Patient and Surescripts    Significant changes made to the medication list    In the past week, patient estimated taking medication this percent of the time: greater than 90%    Additional medication history information:       Medication reconciliation completed by provider prior to medication history? Yes    Time spent in this activity: 10min     Prior to Admission medications    Medication Sig Last Dose Taking? Auth Provider Long Term End Date   apremilast (OTEZLA) 30 MG tablet Take 1 tablet by mouth 2 times daily 7/2/2022 at Unknown time Yes Unknown, Entered By History     cyclobenzaprine (FLEXERIL) 10 MG tablet Take 10 mg by mouth 3 times daily as needed for muscle spasms  Yes Unknown, Entered By History     folic acid (FOLVITE) 1 MG tablet Take 1 tablet by mouth daily 7/2/2022 at Unknown time Yes Unknown, Entered By History     methotrexate sodium 2.5 MG TABS Take 10 tablets (25 mg) by mouth once a week On Mondays Past Week at Unknown time Yes Brandon Brown MD, MD Yes    nitroFURantoin macrocrystal-monohydrate (MACROBID) 100 MG capsule Take 1 capsule (100 mg) by mouth 2 times daily for 3 days 7/2/2022 at Unknown time Yes Romeo Hyde MD  7/4/22   oxybutynin (DITROPAN) 5 MG tablet Take 1 tablet (5 mg) by mouth 3 times daily 7/2/2022 at Unknown time Yes Michael Ho MD     oxyCODONE (ROXICODONE) 5 MG tablet Take 1-2 tablets (5-10 mg) by mouth every 4 hours as needed for moderate to severe pain  Yes Michael Ho MD     phenazopyridine (AZO) 97.5 MG tablet Take 1 tablet (97.5 mg) by mouth 3 times daily for 3 days 7/2/2022 at Unknown time Yes Michael Ho MD  7/4/22   senna-docusate (SENOKOT-S/PERICOLACE) 8.6-50 MG tablet Take 1-2 tablets by mouth 2 times  daily 7/2/2022 at Unknown time Yes Michael Ho MD     tamsulosin (FLOMAX) 0.4 MG capsule Take 1 capsule (0.4 mg) by mouth daily for 7 days 7/2/2022 at Unknown time Yes Michael Ho MD  7/8/22   ORDER FOR DME Equipment being ordered: Lift chair   Cabrera Magaña,      predniSONE (DELTASONE) 10 MG tablet Take 10 mg by mouth See Admin Instructions Take 15 mg for 3-5 days, then taper by 5 mg every 3-5 days until off prn   Unknown, Entered By History         The information provided in this note is only as accurate as the sources available at the time of update(s)   Renate ShahD

## 2022-07-04 VITALS
WEIGHT: 208.9 LBS | OXYGEN SATURATION: 95 % | DIASTOLIC BLOOD PRESSURE: 87 MMHG | RESPIRATION RATE: 16 BRPM | TEMPERATURE: 97.8 F | SYSTOLIC BLOOD PRESSURE: 161 MMHG | HEART RATE: 52 BPM | BODY MASS INDEX: 31.3 KG/M2

## 2022-07-04 LAB
ANION GAP SERPL CALCULATED.3IONS-SCNC: 4 MMOL/L (ref 3–14)
BUN SERPL-MCNC: 12 MG/DL (ref 7–30)
CALCIUM SERPL-MCNC: 8.8 MG/DL (ref 8.5–10.1)
CHLORIDE BLD-SCNC: 108 MMOL/L (ref 94–109)
CO2 SERPL-SCNC: 29 MMOL/L (ref 20–32)
CREAT SERPL-MCNC: 0.92 MG/DL (ref 0.66–1.25)
ERYTHROCYTE [DISTWIDTH] IN BLOOD BY AUTOMATED COUNT: 13.8 % (ref 10–15)
GFR SERPL CREATININE-BSD FRML MDRD: >90 ML/MIN/1.73M2
GLUCOSE BLD-MCNC: 97 MG/DL (ref 70–99)
HCT VFR BLD AUTO: 44.9 % (ref 40–53)
HGB BLD-MCNC: 14.6 G/DL (ref 13.3–17.7)
MCH RBC QN AUTO: 32.7 PG (ref 26.5–33)
MCHC RBC AUTO-ENTMCNC: 32.5 G/DL (ref 31.5–36.5)
MCV RBC AUTO: 101 FL (ref 78–100)
PLATELET # BLD AUTO: 128 10E3/UL (ref 150–450)
POTASSIUM BLD-SCNC: 3.9 MMOL/L (ref 3.4–5.3)
RBC # BLD AUTO: 4.46 10E6/UL (ref 4.4–5.9)
SODIUM SERPL-SCNC: 141 MMOL/L (ref 133–144)
WBC # BLD AUTO: 6.3 10E3/UL (ref 4–11)

## 2022-07-04 PROCEDURE — 85027 COMPLETE CBC AUTOMATED: CPT | Performed by: INTERNAL MEDICINE

## 2022-07-04 PROCEDURE — 99239 HOSP IP/OBS DSCHRG MGMT >30: CPT | Performed by: INTERNAL MEDICINE

## 2022-07-04 PROCEDURE — 82310 ASSAY OF CALCIUM: CPT | Performed by: INTERNAL MEDICINE

## 2022-07-04 PROCEDURE — 258N000003 HC RX IP 258 OP 636: Performed by: PHYSICIAN ASSISTANT

## 2022-07-04 PROCEDURE — 36415 COLL VENOUS BLD VENIPUNCTURE: CPT | Performed by: INTERNAL MEDICINE

## 2022-07-04 RX ORDER — CEPHALEXIN 500 MG/1
500 CAPSULE ORAL 3 TIMES DAILY
Qty: 21 CAPSULE | Refills: 0 | Status: SHIPPED | OUTPATIENT
Start: 2022-07-04 | End: 2022-07-11

## 2022-07-04 RX ADMIN — SODIUM CHLORIDE: 9 INJECTION, SOLUTION INTRAVENOUS at 05:27

## 2022-07-04 ASSESSMENT — ACTIVITIES OF DAILY LIVING (ADL)
ADLS_ACUITY_SCORE: 18

## 2022-07-04 NOTE — PROGRESS NOTES
Urology    Seen and examined    Patient feeling well today, denies fevers    BP (!) 151/88 (BP Location: Left arm)   Pulse 54   Temp 97.9  F (36.6  C) (Oral)   Resp 16   Wt 94.8 kg (208 lb 14.4 oz)   SpO2 95%   BMI 31.30 kg/m    Hanson in place clear yellow urine  randy with scant serous output    Leukocytosis and AVINASH have resolved      Recommendations  Recommend discharge to home with Hanson catheter and RANDY drain in place  Recommend empiric abx e.g. keflex 500 mg tid x 7 days for coverage given the significant urine extravasation  Will send message to Dr. Ho to finalize urology followup plan, likely cystogram in 2 weeks    Juan José Fuentes MD   [facility]  [phone] clinic phone

## 2022-07-04 NOTE — DISCHARGE INSTRUCTIONS
Please call your Urologist tomorrow to schedule your follow up appointment at [facility]  [phone].

## 2022-07-04 NOTE — DISCHARGE SUMMARY
[facility]  Hospitalist Discharge Summary      Date of Admission:  7/2/2022  Date of Discharge:  7/4/2022  Discharging Provider: Jennyfer Noble MD  Discharge Service: Hospitalist Service    Discharge Diagnoses   Bladder perforation  S/p CT guided pelvic drain placement on 7/3  Right ureteral stone with recent cystoscopy, lithotripsy and right ureteral stent placement (7/1)  AVINASH resolved  Leukocytosis resolved    Psoriasis  HLP  Hypothyroidism      Follow-ups Needed After Discharge   Follow-up Appointments     Follow-up and recommended labs and tests       Follow up with your urologist within 2 weeks.   Follow up with primary care doctor in 1-2 weeks.             Unresulted Labs Ordered in the Past 30 Days of this Admission     Date and Time Order Name Status Description    7/1/2022  2:29 PM Stone analysis In process           Discharge Disposition   Discharged to home  Condition at discharge: Stable  Patient ready to discharge to a skilled nursing facility as soon as possible in order to create capacity for patients related to the COVID-19 pandemic.    Hospital Course              Ramesh Galvin is a 61 year old male admitted on 7/2/2022.     Past medical history significant for Right ureteral stone with recent cystoscopy, lithotripsy and right ureteral stent placement (7/1), HLP, Hypothyroidism, MS, Psoriasis who was directly admitted to [facility] due to bladder perforation and ARF.       Patient presented to [facility] ED due to concerns of urinary retention.  Notably, patient underwent a cystoscopy with lithotripsy and right ureteral stent placement at [facility] on 7/1/2022.  Patient noted that he had had decreased urine output since returning home  and has not really had the sensation to urinate since then.     Work-up in the ED included a basic metabolic panel that revealed a creatinine of 3.5 , WBC 21K. Abdomen/pelvis CT without contrast was suspicious for  bladder perforation, possibly with both intra and extraperitoneal components.  A CT cystogram with and without contrast was completed and confirmed anterior bladder wall rupture.  Contrast appeared confined to the extraperitoneal space, there may be some peritoneal communication given previously described perihepatic and perisplenic fluid.  Urology was contacted and advised transfer to [facility]  For further management     Bladder perforation  S/p CT guided pelvic drain placement on 7/3  Right ureteral stone with recent cystoscopy, lithotripsy and right ureteral stent placement (7/1)  - s/p  CT guided percutaneous drain placement by IR as above  - treated with ceftriaxone while in hospital. Urology recommends keflex TID x 7 days for coverage given urine extravasation  - will discharge home with Hanson cathter and MI drain in place  - patient to follow up with urology with in 2 weeks and possible cystogram  - continue with PTA Flomax and Ditropan at discharge    Leukocytosis-resolved  This is reactive in the setting of above. WBC was 21 on admission, has normalized on day of discharge.     Acute kidney injury-resolved  - likey secondary to above, improving  - Cr 3.5-->1.6-->0.9.   - has received IVF while in hospital.   - discharging home with Hanson cath as above     Psoriasis  - resume PTA methotrexate 25 mg weekly at discharge  - Resumed on PTA folic acid 1 mg/d.    - Resumed on PTA Otezla 30 mg BID.      HLP  - Not currently on any medications.       Hypothyroidism  - Not currently on any medications.        Consultations This Hospital Stay   UROLOGY IP CONSULT    Code Status   Full Code    Time Spent on this Encounter   I, Jennyfer Noble MD, personally saw the patient today and spent 33 minutes discharging this patient.       Jennyfer Noble MD  [facility]  [address] [place] [id number]  Phone: [phone]  Fax:  [phone]  ______________________________________________________________________    Physical Exam   Vital Signs: Temp: 97.8  F (36.6  C) Temp src: Oral BP: (!) 161/87 Pulse: 52   Resp: 16 SpO2: 95 % O2 Device: None (Room air)    Weight: 208 lbs 14.4 oz  General Appearance: Alert,awake and no apparent distress  Respiratory: clear to auscultation bilaterally, no wheezing  Cardiovascular: regular rate and rhythm  GI: soft and mild discomfort in lower abdomen  Skin: warm and dry         Primary Care Physician   [facility]    Discharge Orders      No lifting over 15 pounds and no strenuous physical activity    for 2 weeks     No Alcohol    for 24 hours post procedure     Encourage fluids    Encourage fluids at home to keep urine clear to light pink     Discharge Instructions    Patient to arrange for follow up appointment in about 2 weeks     Follow-up and recommended labs and tests     Follow up with your urologist within 2 weeks.   Follow up with primary care doctor in 1-2 weeks.     Tubes and drains    You are going home with the following tubes or drains: aceves catheter.  Tube cares per hospital or home care instructions  MI.  Tube cares per hospital or home care instructions     Reason for your hospital stay    You were in the hospital for ruptured bladder. You will be going home with Aceves cathter and MI drain that will be followed up by your urologist.     Activity    Your activity upon discharge: refer to activity limitations recommended by your urologist.     Diet    Follow this diet upon discharge: Orders Placed This Encounter      Regular Diet Adult       Significant Results and Procedures   Most Recent 3 CBC's:Recent Labs   Lab Test 07/04/22  0708 07/03/22  0605 07/02/22  1342   WBC 6.3 12.4* 21.0*   HGB 14.6 13.7 16.1   * 98 100   * 154 203     Most Recent 3 BMP's:Recent Labs   Lab Test 07/04/22  0708 07/03/22  0605 07/02/22  1342    138 134   POTASSIUM 3.9 4.6 5.2    CHLORIDE 108 109 102   CO2 29 26 24   BUN 12 19 30   CR 0.92 1.60* 3.50*   ANIONGAP 4 3 8   LANCE 8.8 8.5 9.2   GLC 97 100* 129*   ,   Results for orders placed or performed during the hospital encounter of 07/02/22   CT Abdomen Peritoneum Abscess Drainage    Narrative    [place] [facility]  LOCATION: [facility]  DATE: 7/3/2022    PROCEDURE: CT GUIDED ANTERIOR PELVIC DRAINAGE CATHETER PLACEMENT    INTERVENTIONAL RADIOLOGIST: Preston Garcia MD.    INDICATION: 61-year-old male with an extraperitoneal bladder rupture.  Placement of an anterior low pelvic drainage catheter adjacent to the  perforation is requested for urinary diversion.    CONSENT: The risks, benefits and alternatives of imaging guided  drainage catheter placement were discussed with the patient  in  detail. All questions were answered. Informed consent was given to  proceed with the procedure.    MODERATE SEDATION: Versed 1 mg IV; Fentanyl 50 mcg IV.  Under  physician supervision, Versed and fentanyl were administered for  moderate sedation. Pulse oximetry, heart rate and blood pressure were  continuously monitored by an independent trained observer. The  physician spent 30 minutes of face-to-face sedation time with the  patient.    CONTRAST: None.  ANTIBIOTICS: None.  ADDITIONAL MEDICATIONS: None.    CT FLUOROSCOPIC TIME: 3 seconds.  RADIATION DOSE: Dose Length Product: 300 mGy-cm.  Dose reduction  techniques were utilized.    COMPLICATIONS: No immediate complications.    STERILE BARRIER TECHNIQUE: Maximum sterile barrier technique was used.  Cutaneous antisepsis was performed at the operative site with  application of 2% chlorhexidine and large sterile drape. Prior to the  procedure, the  and assistant performed hand hygiene and wore  hat, mask, sterile gown, and sterile gloves during the entire  procedure.    PROCEDURE:    A limited nonenhanced CT study was performed for localization  purposes. Based on the results of this study an  anterior left  paramedian approach was chosen.     Using intermittent CT guidance, a needle was inserted anterior to the  low left paramedian bladder. A wire was advanced and coiled in the  fluid collection. Following tract dilation, a 10 French drainage  catheter was advanced and secured using sutures. The catheter was  attached to bulb drainage.    FINDINGS:  The initial CT images demonstrates interval resolution of the fluid  anterior to the bladder compared with the study from yesterday. A  small amount of extraluminal gas is present within the anterior  pelvis. A Hanson catheter is in place.    The completion CT images shows the catheter within the anterior low  pelvic peritoneal fat adjacent to the gallbladder. No evidence of  significant hemorrhage.      Impression    IMPRESSION:    Successful CT-guided pelvic drainage, as discussed above.    BIN SUNSHINE MD         SYSTEM ID:  [id number]       Discharge Medications   Current Discharge Medication List      START taking these medications    Details   cephALEXin (KEFLEX) 500 MG capsule Take 1 capsule (500 mg) by mouth 3 times daily for 7 days  Qty: 21 capsule, Refills: 0    Associated Diagnoses: Bladder rupture         CONTINUE these medications which have NOT CHANGED    Details   apremilast (OTEZLA) 30 MG tablet Take 1 tablet by mouth 2 times daily      cyclobenzaprine (FLEXERIL) 10 MG tablet Take 10 mg by mouth 3 times daily as needed for muscle spasms      folic acid (FOLVITE) 1 MG tablet Take 1 tablet by mouth daily      methotrexate sodium 2.5 MG TABS Take 10 tablets (25 mg) by mouth once a week On Mondays  Qty: 1 tablet, Refills: 0    Comments: Skip methotrexate 6/20 and resume 6/27.  Associated Diagnoses: Psoriatic arthritis (H)      oxybutynin (DITROPAN) 5 MG tablet Take 1 tablet (5 mg) by mouth 3 times daily  Qty: 21 tablet, Refills: 0    Associated Diagnoses: Bladder spasms      oxyCODONE (ROXICODONE) 5 MG tablet Take 1-2 tablets (5-10 mg) by mouth  every 4 hours as needed for moderate to severe pain  Qty: 6 tablet, Refills: 0    Associated Diagnoses: Right kidney stone      senna-docusate (SENOKOT-S/PERICOLACE) 8.6-50 MG tablet Take 1-2 tablets by mouth 2 times daily  Qty: 30 tablet, Refills: 0    Associated Diagnoses: Right kidney stone      tamsulosin (FLOMAX) 0.4 MG capsule Take 1 capsule (0.4 mg) by mouth daily for 7 days  Qty: 7 capsule, Refills: 0    Associated Diagnoses: Right kidney stone      ORDER FOR DME Equipment being ordered: Lift chair  Qty: 1 each, Refills: 0    Associated Diagnoses: Multiple sclerosis (H)      predniSONE (DELTASONE) 10 MG tablet Take 10 mg by mouth See Admin Instructions Take 15 mg for 3-5 days, then taper by 5 mg every 3-5 days until off prn         STOP taking these medications       nitroFURantoin macrocrystal-monohydrate (MACROBID) 100 MG capsule Comments:   Reason for Stopping:         phenazopyridine (AZO) 97.5 MG tablet Comments:   Reason for Stopping:             Allergies   Allergies   Allergen Reactions     Codeine Sulfate

## 2022-07-04 NOTE — PROGRESS NOTES
Problem: bladder perforation, AVINASH, & R ureteral stone  With cystoscopy, lithrotripsy, and R ureteral stent placement on 7/1/22  Vitals and oxygen: VSS on RA  Orientation/Neuro: A&Ox4  Activity Level: SBA  Diet: regular however has not had anything other then tea and meds r/t fear of nausea  Comorbidity: HLP, MS, psoriasis, & hypothyroidism  Labs: creatinine 1.6, GFR 48, and WBC 12.4  IV Fluids/Drains/Tubes: IVF infusing NS to R arm   Pain Management: pain managed with PRN APAP and oxycodone  Skin: MI site to LLQ is CDI   GI/: BS active, passing flatus, no BM, voiding spontaneously, and MI to LLQ  Respiratory: WDL  Cardio: WDL  Plan: probable discharge 7/4/22

## 2022-07-04 NOTE — PROGRESS NOTES
Discharge    Patient discharged to Home via Car with Family  Care plan note A&Ox4. VSS on RA, elevated BP. Up Ind in room. Reg diet. Home with MI drain and Hanson.    Listed belongings gathered and given to patient (including from security/pharmacy). Yes  Care Plan and Patient education resolved: Yes  Prescriptions if needed, hard copies sent with patient  NA  Medication Bin checked and emptied on discharge Yes  SW/care coordinator/charge RN aware of discharge: Yes

## 2022-07-05 ENCOUNTER — PATIENT OUTREACH (OUTPATIENT)
Dept: CARE COORDINATION | Facility: CLINIC | Age: 62
End: 2022-07-05

## 2022-07-05 DIAGNOSIS — Z71.89 OTHER SPECIFIED COUNSELING: ICD-10-CM

## 2022-07-05 DIAGNOSIS — N20.1 URETERAL STONE: Primary | ICD-10-CM

## 2022-07-05 NOTE — PROGRESS NOTES
Clinic Care Coordination Contact  [facility]/Voicemail       Clinical Data: Care Coordinator Outreach  Outreach attempted x 1.  Left message on patient's voicemail with call back information and requested return call.  Plan:  Care Coordinator will try to reach patient again in 1-2 business days.    Lissett Enriquez  Community Health Worker  [facility]  [phone]

## 2022-07-06 LAB
APPEARANCE STONE: NORMAL
COMPN STONE: NORMAL
SPECIMEN WT: 21 MG

## 2022-07-06 NOTE — PROGRESS NOTES
Clinic Care Coordination Contact  [facility]/Voicemail       Clinical Data: Care Coordinator Outreach  Outreach attempted x 2.  Left message on patient's voicemail with call back information and requested return call.  Plan: Care Coordinator will do no further outreaches at this time.    Lissett Enriquez  Community Health Worker  [facility]  [phone]

## 2022-07-07 LAB
ATRIAL RATE - MUSE: 56 BPM
DIASTOLIC BLOOD PRESSURE - MUSE: NORMAL MMHG
INTERPRETATION ECG - MUSE: NORMAL
P AXIS - MUSE: 35 DEGREES
PR INTERVAL - MUSE: 148 MS
QRS DURATION - MUSE: 100 MS
QT - MUSE: 430 MS
QTC - MUSE: 414 MS
R AXIS - MUSE: 13 DEGREES
SYSTOLIC BLOOD PRESSURE - MUSE: NORMAL MMHG
T AXIS - MUSE: 25 DEGREES
VENTRICULAR RATE- MUSE: 56 BPM

## 2022-07-11 ENCOUNTER — TELEPHONE (OUTPATIENT)
Dept: UROLOGY | Facility: CLINIC | Age: 62
End: 2022-07-11

## 2022-07-11 NOTE — TELEPHONE ENCOUNTER
Spoke to pt. Informed pt that cystogram is needed prior to removing catheter. Pt verbalized understanding.      Kaylah Soriano RN MSN

## 2022-07-11 NOTE — TELEPHONE ENCOUNTER
[facility] Call Center    Phone Message    May a detailed message be left on voicemail: yes     Reason for Call: Patient is calling to see if he can have his catheter removed on/around July 14th before his cystoscopy on the 07/27/22. Please reach out to patient to discuss.  Thanks!    Action Taken: Other: Urology    Travel Screening: Not Applicable

## 2022-07-14 ENCOUNTER — HOSPITAL ENCOUNTER (OUTPATIENT)
Dept: CT IMAGING | Facility: CLINIC | Age: 62
Discharge: HOME | End: 2022-07-14
Attending: UROLOGY | Admitting: UROLOGY
Payer: COMMERCIAL

## 2022-07-14 ENCOUNTER — PRE VISIT (OUTPATIENT)
Dept: UROLOGY | Facility: CLINIC | Age: 62
End: 2022-07-14

## 2022-07-14 DIAGNOSIS — N20.1 URETERAL STONE: ICD-10-CM

## 2022-07-14 PROCEDURE — 72194 CT PELVIS W/O & W/DYE: CPT

## 2022-07-14 PROCEDURE — 250N000011 HC RX IP 250 OP 636: Performed by: UROLOGY

## 2022-07-14 PROCEDURE — 250N000009 HC RX 250: Performed by: UROLOGY

## 2022-07-14 RX ORDER — IOPAMIDOL 755 MG/ML
500 INJECTION, SOLUTION INTRAVASCULAR ONCE
Status: COMPLETED | OUTPATIENT
Start: 2022-07-14 | End: 2022-07-14

## 2022-07-14 RX ADMIN — IOPAMIDOL 50 ML: 755 INJECTION, SOLUTION INTRAVENOUS at 15:09

## 2022-07-14 RX ADMIN — SODIUM CHLORIDE 50 ML: 9 INJECTION, SOLUTION INTRAVENOUS at 15:09

## 2022-07-14 NOTE — CONFIDENTIAL NOTE
Reason for visit: post-op cystoscopy stent removal    Relevant information: right ureteral calculus    Records/imaging/labs/orders: cystogram scheduled 7/14/2022    Pt called: n/a    At Rooming: give cipro, stent grasper, blue box    Angeles Dickey  7/14/2022  1:42 PM

## 2022-07-15 ENCOUNTER — TELEPHONE (OUTPATIENT)
Dept: UROLOGY | Facility: CLINIC | Age: 62
End: 2022-07-15

## 2022-07-15 NOTE — TELEPHONE ENCOUNTER
[facility] Call Center    Phone Message    May a detailed message be left on voicemail: yes     Reason for Call: Patient calling to see if Dr. Ho reviewed the CT scan to determine if patient can have catheter removed. Please give patient a call to discuss. Thank you    Action Taken: Message routed to:  [facility] Uro    Travel Screening: Not Applicable

## 2022-07-18 NOTE — TELEPHONE ENCOUNTER
[facility] Call Center    Phone Message    May a detailed message be left on voicemail: yes     Reason for Call: Other: pt calling again, and is upset as to why nobody can call him back about his cath, please advise with pt   Pt wants to know why nobodycan call him back.  Action Taken: Other: DONOVANP lindsay    Travel Screening: Not Applicable

## 2022-07-18 NOTE — TELEPHONE ENCOUNTER
Pt is calling in regards to below and is wondering if he can get his cath taken out today or not, pt would like a call back either way, please call pt and LVM if no answer, thanks!

## 2022-07-18 NOTE — TELEPHONE ENCOUNTER
Michael Ho MD  You; Angeles Dickey 3 days ago     HA    Please have him come in next Wednesday for cysto and stent and aceves removal thanks     Message text       Call center called with pt on the line. Pt wondering about results and appt. Writer stated Dr Ho's message above. Pt is scheduled for 7/27. Writer inquiring if appt should be 7/20 instead? Writer stated results will be reviewed at time of appt.

## 2022-07-19 NOTE — TELEPHONE ENCOUNTER
[place], [place]  You 22 hours ago (2:35 PM)     YP    We don't have availability for 7/20/2022. He should keep the 7/27/2022 appt.

## 2022-07-27 ENCOUNTER — OFFICE VISIT (OUTPATIENT)
Dept: UROLOGY | Facility: CLINIC | Age: 62
End: 2022-07-27
Payer: COMMERCIAL

## 2022-07-27 VITALS
WEIGHT: 200 LBS | BODY MASS INDEX: 30.31 KG/M2 | DIASTOLIC BLOOD PRESSURE: 93 MMHG | HEIGHT: 68 IN | SYSTOLIC BLOOD PRESSURE: 148 MMHG | HEART RATE: 53 BPM

## 2022-07-27 DIAGNOSIS — N20.1 URETERAL STONE: Primary | ICD-10-CM

## 2022-07-27 PROCEDURE — 52310 CYSTOSCOPY AND TREATMENT: CPT | Performed by: UROLOGY

## 2022-07-27 RX ORDER — GENTAMICIN 40 MG/ML
80 INJECTION, SOLUTION INTRAMUSCULAR; INTRAVENOUS ONCE
Status: COMPLETED | OUTPATIENT
Start: 2022-07-27 | End: 2022-07-27

## 2022-07-27 RX ADMIN — GENTAMICIN 80 MG: 40 INJECTION, SOLUTION INTRAMUSCULAR; INTRAVENOUS at 10:41

## 2022-07-27 NOTE — PATIENT INSTRUCTIONS
Please schedule an appointment with Aissatou Hicks CNP in 1 month with a renal US prior.    We have sent the order for the 24-hour urine collection kit. If you are able to complete this prior to the visit, Aissatou Hicks CNP will be able to review the results at that time.    Please complete the LithoLink 24hr Urine collection.    If you do not receive the LithoLink in 7-10 days please call [phone].   Once you complete the kit and return it, please contact us on castaclip or call [phone] to schedule a follow up appointment if one is not already made.  The results of the 24 hr urine collection will not appear on castaclip.     [facility] is a laboratory that specializes in 24-hour urine testing for kidney stone  formers. Your provider has requested that you complete a Litholink At-Home kit. The  At-Home kit will be shipped directly to your home (or address provided). Your provider  is waiting on these test results in order to start your kidney stone treatment plan.  Things to know about your Litholink At-Home kit:      Expect your At -Home kit to arrive 5-7 business days from the date the order was  placed.  Your At-Home kit will include everything you need to complete your 24-hour urine  collection(s). Detailed instructions, collection supplies, return shipping box, and a  pre-paid Fed-Ex label are being sent directly to you.    If you are planning to begin your At-Home kit immediately upon receipt, note the  Followin. Eat and drink normally the day before you start your collection and during  the collection process.    2. Stop taking Vitamin C (pill form, vitamins, and/or supplements) that is  greater than 100 mg per day 5 days prior to the start of your At-Home kit.  Vitamin C occurring in foods and drinks can be ingested as normal.    Upon completing and returning your At-Home kit allow 7-10 days for your provider  to receive your Litholink results.    [email or web address]

## 2022-07-27 NOTE — PROGRESS NOTES
"Chief Complaint   Patient presents with     Cystoscopy       The following medication was given:     MEDICATION:  Gentamicin  ROUTE: IM  SITE: Ventrogluteal - Right  DOSE: 80 mg  LOT #: [id number]  : [employer]  EXPIRATION DATE: 09/2023  NDC#: 73641-722-55   Was there drug waste? No        Prior to injection, verified patient identity using patient's name and date of birth.  Due to injection administration, patient instructed to remain in clinic for 15 minutes  afterwards, and to report any adverse reaction to me immediately.    Drug Amount Wasted:  None.  Vial/Syringe: Single dose vial      Angeles Dickey  7/27/2022  10:30 AM      Urology Clinic     HPI  Ramesh Galvin is a 62 year old male with history of right ureteral stone status post right URS/HLL complicated by anterior bladder wall perforation (unclear reason most likely due to urinary clot retention postoperatively), here for follow up.     He has been doing well. There is minimal output from his drain. Urine is clear most of the time and it gets bloody with small clots when he strains and does some activities. He denies any fever, chills, or abdominal pain.        No changes to health, hospitalizations or new diagnoses in the interim    PHYSICAL EXAM  BP (!) 148/93   Pulse 53   Ht 1.727 m (5' 8\")   Wt 90.7 kg (200 lb)   BMI 30.41 kg/m     Constitutional: AO, pleasant, NAD  Resp: Non-labored breathing on room air  Abd: Soft, NT, ND, drain in place draining minimal output (drain was removed at bedside with no issues)    : Hanson in place draining clear urine      Labs  Lab Results   Component Value Date    WBC 6.3 07/04/2022    WBC 7.7 04/03/2013     Lab Results   Component Value Date    RBC 4.46 07/04/2022    RBC 5.16 04/03/2013     Lab Results   Component Value Date    HGB 14.6 07/04/2022    HGB 16.4 04/03/2013     Lab Results   Component Value Date    HCT 44.9 07/04/2022    HCT 50.3 04/03/2013     No components found for: MCT  Lab " Results   Component Value Date     07/04/2022    MCV 98 04/03/2013     Lab Results   Component Value Date    MCH 32.7 07/04/2022    MCH 31.8 04/03/2013     Lab Results   Component Value Date    MCHC 32.5 07/04/2022    MCHC 32.6 04/03/2013     Lab Results   Component Value Date    RDW 13.8 07/04/2022    RDW 14.3 04/03/2013     Lab Results   Component Value Date     07/04/2022     04/03/2013        Last Comprehensive Metabolic Panel:  Sodium   Date Value Ref Range Status   07/04/2022 141 133 - 144 mmol/L Final     Potassium   Date Value Ref Range Status   07/04/2022 3.9 3.4 - 5.3 mmol/L Final     Chloride   Date Value Ref Range Status   07/04/2022 108 94 - 109 mmol/L Final     Carbon Dioxide (CO2)   Date Value Ref Range Status   07/04/2022 29 20 - 32 mmol/L Final     Anion Gap   Date Value Ref Range Status   07/04/2022 4 3 - 14 mmol/L Final     Glucose   Date Value Ref Range Status   07/04/2022 97 70 - 99 mg/dL Final     Urea Nitrogen   Date Value Ref Range Status   07/04/2022 12 7 - 30 mg/dL Final     Creatinine   Date Value Ref Range Status   07/04/2022 0.92 0.66 - 1.25 mg/dL Final     GFR Estimate   Date Value Ref Range Status   07/04/2022 >90 >60 mL/min/1.73m2 Final     Comment:     Effective December 21, 2021 eGFRcr in adults is calculated using the 2021 CKD-EPI creatinine equation which includes age and gender (Waqas et al., NEJ, DOI: 10.1056/BXSFea9604426)     Calcium   Date Value Ref Range Status   07/04/2022 8.8 8.5 - 10.1 mg/dL Final     Bilirubin Total   Date Value Ref Range Status   06/18/2022 0.7 0.2 - 1.3 mg/dL Final   04/03/2013 0.6 0.2 - 1.3 mg/dL Final     Alkaline Phosphatase   Date Value Ref Range Status   06/18/2022 70 40 - 150 U/L Final   04/03/2013 59 40 - 150 U/L Final     ALT   Date Value Ref Range Status   06/18/2022 24 0 - 70 U/L Final   04/03/2013 103 (H) 0 - 70 U/L Final     AST   Date Value Ref Range Status   06/18/2022 24 0 - 45 U/L Final   04/03/2013 53 (H) 0 - 45  U/L Final         Imaging   No evidence of leak on his most recent CT cystogram     CT CYSTOGRAM WITHOUT AND WITH CONTRAST  7/14/2022 3:26 PM     CLINICAL HISTORY: Ureteral stone.     TECHNIQUE: CT scan of the pelvis was performed without IV contrast.  Precontrast scanning of the pelvis. Postcontrast scanning of the  pelvis after injection into the Hanson catheter along with postdrainage  imaging. Multiplanar reformats were obtained. Dose reduction  techniques were used.  CONTRAST: 50 mL Isovue-370 injected into the bladder.     COMPARISON: CT abdomen and pelvis 7/2/2022. CT drain placement  7/3/2022.     FINDINGS:   PELVIC ORGANS: There is a percutaneous drain entering the anterior  pelvis on the left with the pigtail to the right of the bladder  anteriorly. There is no fluid collection identified about the drain.  Hanson catheter within the bladder. Ureter stent also noted on the  right. After contrast injection into the bladder lumen, there is no  evidence of leakage of contrast outside of the bladder lumen. The  delayed postdrainage images also do not show contrast accumulation  outside of the bladder. No visible fistula can be seen. A few bubbles  of gas within the soft tissues near the percutaneous drain noted.  There is a degree of indentation upon the posterior urinary bladder  from the adjacent prostate that is 4.2 cm.     ADDITIONAL FINDINGS: The bowel at the pelvis shows no acute  abnormality. No adenopathy or new focal fluid identified.     MUSCULOSKELETAL: Unremarkable.                                                                      IMPRESSION:   1.  CT cystogram does not show evidence for contrast leakage outside  of the confines of the bladder.  2.  Left anterior approach pelvic percutaneous drain is in place with  its tip lying anterior to the bladder. A few adjacent small bubbles of  soft tissue gas noted near this drain, but no focal fluid collection  can be seen.  3.  Hanson catheter and right  ureter stent are in place.     JARET BARKER MD        Cystoscopy procedure     The indwelling Hanson catheter was removed after deflating the balloon with no issues.After sterile preparation and draping of the patient,  a 16-Cypriot flexible cystoscope was introduced via the urethra.  It was passed without difficulty into the bladder.  The urethra was open without evidence of stricture.  The existing ureteral stent was identified, grasped with a flexible grasper, and removed entirely with no issues. Scope was then removed and patient tolerated the procedure well.     15 total minutes was spent on cystoscopy procedure alone.            Assessment     62 year old male with right ureteral stone status post URS/HLL complicated by bladder perforation which has healed appropriately     We briefly discussed his stone composition and the recommended 24 hr urine metabolic work up.     Plan   Follow up in 1 month with renal US   Send litholink       20 total minutes spent on the date of the encounter including direct interaction with the patient, performing chart review, documentation and further activities as noted above, exclusive of the time spent on performing cystoscopy      Michael Ho MD   Department of Urology   [facility]

## 2022-07-27 NOTE — NURSING NOTE
"Chief Complaint   Patient presents with     Cystoscopy       Blood pressure (!) 148/93, pulse 53, height 1.727 m (5' 8\"), weight 90.7 kg (200 lb). Body mass index is 30.41 kg/m .    Patient Active Problem List   Diagnosis     Congenital anomaly of cerebrovascular system     Multiple sclerosis (H)     Right Ureteral kidney stone with Right Hydronephrosis     Obesity (BMI 30-39.9)     AVINASH (acute kidney injury) (H)       Allergies   Allergen Reactions     Codeine Sulfate        Current Outpatient Medications   Medication Sig Dispense Refill     apremilast (OTEZLA) 30 MG tablet Take 1 tablet by mouth 2 times daily       cyclobenzaprine (FLEXERIL) 10 MG tablet Take 10 mg by mouth 3 times daily as needed for muscle spasms       folic acid (FOLVITE) 1 MG tablet Take 1 tablet by mouth daily       methotrexate sodium 2.5 MG TABS Take 10 tablets (25 mg) by mouth once a week On  1 tablet 0     ORDER FOR DME Equipment being ordered: Lift chair 1 each 0     oxybutynin (DITROPAN) 5 MG tablet Take 1 tablet (5 mg) by mouth 3 times daily 21 tablet 0     oxyCODONE (ROXICODONE) 5 MG tablet Take 1-2 tablets (5-10 mg) by mouth every 4 hours as needed for moderate to severe pain 6 tablet 0     predniSONE (DELTASONE) 10 MG tablet Take 10 mg by mouth See Admin Instructions Take 15 mg for 3-5 days, then taper by 5 mg every 3-5 days until off prn       senna-docusate (SENOKOT-S/PERICOLACE) 8.6-50 MG tablet Take 1-2 tablets by mouth 2 times daily 30 tablet 0       Social History     Tobacco Use     Smoking status: Former Smoker     Packs/day: 1.50     Years: 0.00     Pack years: 0.00     Types: Cigarettes     Quit date: 1981     Years since quittin.6     Smokeless tobacco: Never Used   Substance Use Topics     Alcohol use: Yes     Comment: 1-2 x/month     Drug use: No       Invasive Procedure Safety Checklist:    Procedure: Cystoscopy    Action: Complete sections and checkboxes as appropriate.    Pre-procedure:  1. Patient " ID Verified with 2 identifiers (Claudia and  or MRN) : YES    2. Procedure and site verified with patient/designee (when able) : YES    3. Accurate consent documentation in medical record : YES    4. H&P (or appropriate assessment) documented in medical record : N/A  H&P must be up to 30 days prior to procedure an updated within 24 hours of                 Procedure as applicable.     5. Relevant diagnostic and radiology test results appropriately labeled and displayed as applicable : YES    6. Blood products, implants, devices, and/or special equipment available for the procedure as applicable : YES    7. Procedure site(s) marked with provider initials [Exclusions: none] : NO    8. Marking not required. Reason : Yes  Procedure does not require site marking    Time Out:     Time-Out performed immediately prior to starting procedure, including verbal and active participation of all team members addressing: YES    1. Correct patient identity.  2. Confirmed that the correct side and site are marked.  3. An accurate procedure to be done.  4. Agreement on the procedure to be done.  5. Correct patient position.  6. Relevant images and results are properly labeled and appropriately displayed.  7. The need to administer antibiotics or fluids for irrigation purposes during the procedure as applicable.  8. Safety precautions based on patient history or medication use.    During Procedure: Verification of correct person, site, and procedure occurs any time the responsibility for care of the patient is transferred to another member of the care team.        Joanna Sood  2022  10:35 AM

## 2022-08-05 ENCOUNTER — PRE VISIT (OUTPATIENT)
Dept: UROLOGY | Facility: CLINIC | Age: 62
End: 2022-08-05

## 2022-08-05 NOTE — TELEPHONE ENCOUNTER
Reason for visit: follow up      Dx/Hx/Sx: ureteral stone     Records/imaging/labs/orders: renal US in epic, Litholink not done    At Rooming: video visit

## 2022-08-29 ENCOUNTER — HOSPITAL ENCOUNTER (OUTPATIENT)
Dept: ULTRASOUND IMAGING | Facility: CLINIC | Age: 62
Discharge: HOME | End: 2022-08-29
Attending: UROLOGY | Admitting: UROLOGY
Payer: COMMERCIAL

## 2022-08-29 DIAGNOSIS — N20.1 URETERAL STONE: ICD-10-CM

## 2022-08-29 PROCEDURE — 76770 US EXAM ABDO BACK WALL COMP: CPT

## 2022-09-02 ENCOUNTER — VIRTUAL VISIT (OUTPATIENT)
Dept: UROLOGY | Facility: CLINIC | Age: 62
End: 2022-09-02
Payer: COMMERCIAL

## 2022-09-02 DIAGNOSIS — N20.0 KIDNEY STONE: Primary | ICD-10-CM

## 2022-09-02 PROCEDURE — 99213 OFFICE O/P EST LOW 20 MIN: CPT | Mod: GT | Performed by: NURSE PRACTITIONER

## 2022-09-02 NOTE — PROGRESS NOTES
Ramesh is a 62 year old who is being evaluated via a billable video visit.      How would you like to obtain your AVS? Mail a copy  If the video visit is dropped, the invitation should be resent by: Send to e-mail at: [email or web address]  Will anyone else be joining your video visit? No    Video-Visit Details    Video Start Time: 2:21 PM    Type of service:  Video Visit    Video End Time: 2:55 PM    Originating Location (pt. Location): Home    Distant Location (provider location):  [facility] [place]     Platform used for Video Visit: Aditya     Ramesh FAMILIA Galvin complains of   Chief Complaint   Patient presents with     stone prevention       Assessment/Plan:  62 year old male s/p right URS/HLL for right ureteral stone, which was c/b anterior bladder wall perforation which has now healed. Stone predominantly CaOx. We reviewed general recommendations to prevent kidney stones including the followin) Low oxalate diet. We discussed foods that are particularly high in oxalate including spinach, rhubarb, beets, nuts, nut butters, and black teas.     2) Low salt diet. Discussed common foods with high amounts of salt as well as dietary strategies to minimize sodium.     3) High fluid intake. Recommend 3 liters of fluid daily to produce goal of 2.5L of urine.     4) Modest animal protein. Recommend limiting animal protein to one serving or less daily.     5) Normal dietary calcium.    -He has received a Litholink kit and will complete this now. He will follow up with me when completed to review results.     Aissatou Hicks, CNP  Department of Urology      Subjective:   62 year old male with a history of right ureteral stone s/p right URS/HLL, which was c/b anterior bladder wall perforation. The reason for the perforation was unclear, but likely due to clot retention post-op. This ultimately healed appropriately. His stone was composed of 90% CaOx and 10% CaP.     Follow up CRISS obtained earlier  this week showed a 9 mm echogenic focus in the upper pole of his right kidney, likely a nonobstructing stone. This also showed a 2 cm anechoic structure adjacent to the stone, likely representing a cyst or dilated upper pole calyx.    He had kidney stones twice before but both of those times he was able to pass them. He has no known family history of stones. Regarding his diet, he eats nuts, typically a handful of walnuts in his cottage cheese. He also sometimes binges on nuts, such as pistachios and almonds. He drinks some almond and soy milk, but mainly drinks oat milk.     He denies any urinary issues today. He is voiding fine and his urine is yellow; no hematuria.     He notes that he received a Litholink kit ordered by Dr. Ho. He did do a 24-hour collection but forgot to add the preservative and therefore needs to start this over.     Objective:     Exam:   Patient is a 62 year old male   No vital signs obtained due to virtual visit.  General Appearance: Well groomed, hygenic  Respiratory: No cough, no respiratory distress or labored breathing  Musculoskeletal: Grossly normal with no gross deficits  Skin: No discoloration or apparent rashes  Neurologic: No tremors  Psychiatric: Alert and oriented  Further examination is deferred due to the nature of our visit.

## 2022-09-02 NOTE — LETTER
2022       RE: Ramesh Galvin  [address] [place] [address]     Dear Colleague,    Thank you for referring your patient, Ramesh Galvin, to the [facility] [place] [facility]. Please see a copy of my visit note below.    Ramesh is a 62 year old who is being evaluated via a billable video visit.      How would you like to obtain your AVS? Mail a copy  If the video visit is dropped, the invitation should be resent by: Send to e-mail at: [email or web address]  Will anyone else be joining your video visit? No    Video-Visit Details    Video Start Time: 2:21 PM  Type of service:  Video Visit  Video End Time: 2:55 PM  Originating Location (pt. Location): Home  Distant Location (provider location):  [facility] [place]   Platform used for Video Visit: Aditya     Ramesh Galvin complains of   Chief Complaint   Patient presents with     stone prevention       Assessment/Plan:  62 year old male s/p right URS/HLL for right ureteral stone, which was c/b anterior bladder wall perforation which has now healed. Stone predominantly CaOx. We reviewed general recommendations to prevent kidney stones including the followin) Low oxalate diet. We discussed foods that are particularly high in oxalate including spinach, rhubarb, beets, nuts, nut butters, and black teas.     2) Low salt diet. Discussed common foods with high amounts of salt as well as dietary strategies to minimize sodium.     3) High fluid intake. Recommend 3 liters of fluid daily to produce goal of 2.5L of urine.     4) Modest animal protein. Recommend limiting animal protein to one serving or less daily.     5) Normal dietary calcium.    -He has received a Litholink kit and will complete this now. He will follow up with me when completed to review results.     Aissatou Hicks, CNP  Department of Urology      Subjective:   62 year old male with a history of right  ureteral stone s/p right URS/HLL, which was c/b anterior bladder wall perforation. The reason for the perforation was unclear, but likely due to clot retention post-op. This ultimately healed appropriately. His stone was composed of 90% CaOx and 10% CaP.     Follow up CRISS obtained earlier this week showed a 9 mm echogenic focus in the upper pole of his right kidney, likely a nonobstructing stone. This also showed a 2 cm anechoic structure adjacent to the stone, likely representing a cyst or dilated upper pole calyx.    He had kidney stones twice before but both of those times he was able to pass them. He has no known family history of stones. Regarding his diet, he eats nuts, typically a handful of walnuts in his cottage cheese. He also sometimes binges on nuts, such as pistachios and almonds. He drinks some almond and soy milk, but mainly drinks oat milk.     He denies any urinary issues today. He is voiding fine and his urine is yellow; no hematuria.     He notes that he received a Litholink kit ordered by Dr. Ho. He did do a 24-hour collection but forgot to add the preservative and therefore needs to start this over.     Objective:     Exam:   Patient is a 62 year old male   No vital signs obtained due to virtual visit.  General Appearance: Well groomed, hygenic  Respiratory: No cough, no respiratory distress or labored breathing  Musculoskeletal: Grossly normal with no gross deficits  Skin: No discoloration or apparent rashes  Neurologic: No tremors  Psychiatric: Alert and oriented  Further examination is deferred due to the nature of our visit.

## 2022-09-02 NOTE — PATIENT INSTRUCTIONS
UROLOGY CLINIC VISIT PATIENT INSTRUCTIONS    -Complete the Litholink kit that you were sent. Call to schedule a follow up visit with me, once completed.     If you have any issues, questions or concerns in the meantime, do not hesitate to contact us at [phone] or via [email or web address].     Aissatou Hicks, CNP  Department of Urology        Calcium Oxalate Stone Prevention Self Management     Drink more fluids:     Drinking more liquids is the best way you can help prevent future stones. Stones can form when substances in the urine are too concentrated. The more you drink, the more urine you will make. This means all substances in the urine will be less concentrated.     How much urine should I be producing?     The usual recommended daily urine production is about 2 to 3 quarts (0472-5914 ml). If you are producing more than 3 quarts of urine on a regular basis, it is possible to deplete important minerals stored in the body.     To measure the amount of urine you produce in a day, you can either:  Collect all urine in a container and measure at the end of the day   Use a measuring cup each time you urinate and add up the amounts at the end of the day      Observe  Color - Dark haider urine is concentrated. Light straw color or lighter is dilute and desirable   Odor - Concentrated urine tends to smell stronger. Dilute urine is nearly odorless     Ways to increase your fluid intake     Increasing the amount of fluid you drink is effective for all types of kidney stones. While water is commonly recommended, all fluids are effective for increasing the amount of urine your body produces.  Focus on starting a lifelong habit, rather than a short-term solution.   Keep liquids on hand that you like. Crystal Light is a low calorie appropriate choice.  Drink out of larger glasses. You'll tend to drink more with each serving.   Have an additional glass of fluid with each meal.   Keep a water or drink bottle at work and fill it  regularly.      *If you are prone to fluid retention, consult your doctor before making changes to your fluid habits.     Low Oxalate Diet:     Avoid excess amounts or daily consumption of these foods:  All nuts and nut products including peanuts, almonds, pecans, peanut butter, almond milk  Rhubarb  Chocolate  Soybeans and soy products   Spinach  Wheat Germ  Beets     Maintain a normal calcium diet:     Researches have found that people with low calcium intakes tend to have more stones. Foods with high calcium content are acceptable and include:  Dairy products (including milk, cheese and yogurt)  Meat and fish  Enriched cereals  Dark green vegetables     What about calcium supplements?      Many people take calcium supplements, either on their own or as prescribed by a doctor. Research has indicated that calcium supplements do not usually pose a risk for stone formation.  Calcium citrate is a better choice for a supplement.     Avoid excess salt:     Salt (sodium chloride) is found in abundance in many foods. High sodium levels in the urine can interfere with the kidney's handling of calcium.      Tips for reducing the salt in your diet:  Don't use salt at the table  Reduce the salt used in food preparation. Try 1/2 teaspoon when recipes call for 1 teaspoon.  Use herbs and spices for flavoring instead of salt.  Avoid salty foods.  Check the label before you buy or use a product. Note sodium and portion size information.  Try to consume less than 2,000 mg/day. (1 teaspoon = 2,000 mg)     Foods with high sodium content include:  Processed meat (including luncheon meats, sausage)   Crackers   Instant cereal   Processed cheese   Canned soups   Chips and snack foods   Soy sauce

## 2022-11-08 ENCOUNTER — TRANSFERRED RECORDS (OUTPATIENT)
Dept: HEALTH INFORMATION MANAGEMENT | Facility: CLINIC | Age: 62
End: 2022-11-08

## 2024-08-26 NOTE — PLAN OF CARE
Goal Outcome Evaluation:  A&O, VSS on RA, SBA in transfers, denies pain, aceves still in use, with good output, suction bulb in LLQ dressing CDI, with very minimal to no output, on regular diet tolerating, urology will see patient tomorrow morning. Continue to monitor.                       Monitor for other difficulty swallowing or other problems.  Stop taking Remeron.  Try glycopyrrolate to help decrease amount of saliva.  This can dry your mouth out.  If this is too excessive drying please stop.  Call your primary care doctor for close follow-up.  Call your cancer doctor to discuss current medication treatment and evaluation and discuss possible side effects of new medications.

## (undated) DEVICE — PREP POVIDONE IODINE SOLUTION 10% 4OZ

## (undated) DEVICE — SUCTION MANIFOLD NEPTUNE 2 SYS 4 PORT 0702-020-000

## (undated) DEVICE — Device

## (undated) DEVICE — SOL NACL 0.9% IRRIG 3000ML BAG 2B7477

## (undated) DEVICE — CATH URETERAL DUAL LUMEN 10FRX54CM M0064051000

## (undated) DEVICE — SOL WATER IRRIG 1000ML BOTTLE 2F7114

## (undated) DEVICE — CATH TRAY FOLEY SURESTEP 16FR W/URNE MTR STLK LATEX A303316A

## (undated) DEVICE — GUIDEWIRE SENSOR DUAL FLEX STR 0.035"X150CM M0066703080

## (undated) DEVICE — GLOVE GAMMEX NEOPRENE ULTRA SZ 7.5 LF 8515

## (undated) DEVICE — PACK CYSTO UMMC CUSTOM

## (undated) DEVICE — PAD CHUX UNDERPAD 23X24" 7136

## (undated) DEVICE — LASER FIBER FLEXIVA PULSE 242 M006L8405910

## (undated) DEVICE — RAD RX ISOVUE 300 (50ML) 61% IOPAMIDOL CHARGE PER ML

## (undated) DEVICE — PREP SKIN SCRUB TRAY 4461A

## (undated) DEVICE — LIGHT HANDLE X1 31140133

## (undated) DEVICE — LINEN TOWEL PACK X5 5464

## (undated) DEVICE — DRSG TELFA 3X8" 1238

## (undated) DEVICE — GLOVE PROTEXIS MICRO 7.0  2D73PM70

## (undated) DEVICE — PACK GOWN 3/PK DISP XL SBA32GPFCB

## (undated) DEVICE — DRAPE C-ARM W/STRAPS 42X72" 07-CA104

## (undated) DEVICE — TUBING SET THERMEDX UROLOGY SGL USE LL0006

## (undated) DEVICE — PACK CYSTO CUSTOM RIDGES

## (undated) DEVICE — BASKET NITINOL TIPLESS HALO  1.5FRX120CM 554120

## (undated) DEVICE — SOL WATER IRRIG 1000ML BOTTLE 07139-09

## (undated) DEVICE — PREP BRUSH SURG SCRUB PROVIDONE IODINE 9% 20ML

## (undated) DEVICE — CATH FOLEY 16FR 5ML LUBRICATH LATEX 0165L16

## (undated) DEVICE — SYR 10ML SLIP TIP W/O NDL 303134

## (undated) DEVICE — SOL NACL 0.9% IRRIG 3000ML BAG 07972-08

## (undated) DEVICE — ENDO SEAL BX PORT BPS-A

## (undated) RX ORDER — DEXAMETHASONE SODIUM PHOSPHATE 4 MG/ML
INJECTION, SOLUTION INTRA-ARTICULAR; INTRALESIONAL; INTRAMUSCULAR; INTRAVENOUS; SOFT TISSUE
Status: DISPENSED
Start: 2022-07-01

## (undated) RX ORDER — EPHEDRINE SULFATE 50 MG/ML
INJECTION, SOLUTION INTRAMUSCULAR; INTRAVENOUS; SUBCUTANEOUS
Status: DISPENSED
Start: 2022-07-01

## (undated) RX ORDER — FENTANYL CITRATE 50 UG/ML
INJECTION, SOLUTION INTRAMUSCULAR; INTRAVENOUS
Status: DISPENSED
Start: 2022-07-01

## (undated) RX ORDER — LIDOCAINE HYDROCHLORIDE 20 MG/ML
JELLY TOPICAL
Status: DISPENSED
Start: 2022-06-18

## (undated) RX ORDER — LIDOCAINE HYDROCHLORIDE 20 MG/ML
INJECTION, SOLUTION EPIDURAL; INFILTRATION; INTRACAUDAL; PERINEURAL
Status: DISPENSED
Start: 2022-07-01

## (undated) RX ORDER — PROPOFOL 10 MG/ML
INJECTION, EMULSION INTRAVENOUS
Status: DISPENSED
Start: 2022-07-01

## (undated) RX ORDER — ACETAMINOPHEN 325 MG/1
TABLET ORAL
Status: DISPENSED
Start: 2022-07-01

## (undated) RX ORDER — CEFAZOLIN SODIUM 1 G/3ML
INJECTION, POWDER, FOR SOLUTION INTRAMUSCULAR; INTRAVENOUS
Status: DISPENSED
Start: 2022-07-01

## (undated) RX ORDER — LIDOCAINE HYDROCHLORIDE 20 MG/ML
JELLY TOPICAL
Status: DISPENSED
Start: 2022-07-01

## (undated) RX ORDER — EPHEDRINE SULFATE 50 MG/ML
INJECTION, SOLUTION INTRAMUSCULAR; INTRAVENOUS; SUBCUTANEOUS
Status: DISPENSED
Start: 2022-06-18

## (undated) RX ORDER — FENTANYL CITRATE-0.9 % NACL/PF 10 MCG/ML
PLASTIC BAG, INJECTION (ML) INTRAVENOUS
Status: DISPENSED
Start: 2022-07-01

## (undated) RX ORDER — ONDANSETRON 2 MG/ML
INJECTION INTRAMUSCULAR; INTRAVENOUS
Status: DISPENSED
Start: 2022-07-01

## (undated) RX ORDER — GENTAMICIN 40 MG/ML
INJECTION, SOLUTION INTRAMUSCULAR; INTRAVENOUS
Status: DISPENSED
Start: 2022-07-27

## (undated) RX ORDER — FENTANYL CITRATE 50 UG/ML
INJECTION, SOLUTION INTRAMUSCULAR; INTRAVENOUS
Status: DISPENSED
Start: 2022-06-18

## (undated) RX ORDER — FENTANYL CITRATE-0.9 % NACL/PF 10 MCG/ML
PLASTIC BAG, INJECTION (ML) INTRAVENOUS
Status: DISPENSED
Start: 2022-06-18

## (undated) RX ORDER — HYDROMORPHONE HYDROCHLORIDE 1 MG/ML
INJECTION, SOLUTION INTRAMUSCULAR; INTRAVENOUS; SUBCUTANEOUS
Status: DISPENSED
Start: 2022-07-01